# Patient Record
Sex: MALE | Race: WHITE | NOT HISPANIC OR LATINO | Employment: FULL TIME | URBAN - NONMETROPOLITAN AREA
[De-identification: names, ages, dates, MRNs, and addresses within clinical notes are randomized per-mention and may not be internally consistent; named-entity substitution may affect disease eponyms.]

---

## 2023-06-06 ENCOUNTER — TELEPHONE (OUTPATIENT)
Dept: PRIMARY CARE | Facility: CLINIC | Age: 43
End: 2023-06-06
Payer: COMMERCIAL

## 2023-06-06 DIAGNOSIS — M79.671 RIGHT FOOT PAIN: Primary | ICD-10-CM

## 2023-06-06 NOTE — TELEPHONE ENCOUNTER
Left patient detailed message about referral.  I did let him know that [facility] does not have any openings soon and to try [facility] down the road.  I asked him to call us and let us know where to send referral to.

## 2023-06-06 NOTE — TELEPHONE ENCOUNTER
"----- Message from Lovely Moyer MA sent at 6/5/2023  3:36 PM EDT -----  Regarding: FW: cortisone shot in right foot  Contact: [phone]    ----- Message -----  From: Pawel Mena \"Mike\"  Sent: 6/5/2023   3:01 PM EDT  To: [facility] [place] [address] Clinical Support Staff  Subject: cortisone shot in right foot                     i am having nerve pain in my right foot near the toes.  I had this some years ago and had cortisone shots in the bottom of foot to reduce inflammation in the central nerve in the foot and has come back since doing martial arts bare foot on the matts.    "

## 2023-06-26 ENCOUNTER — APPOINTMENT (OUTPATIENT)
Dept: PRIMARY CARE | Facility: CLINIC | Age: 43
End: 2023-06-26
Payer: COMMERCIAL

## 2023-07-27 ENCOUNTER — OFFICE VISIT (OUTPATIENT)
Dept: PRIMARY CARE | Facility: CLINIC | Age: 43
End: 2023-07-27
Payer: COMMERCIAL

## 2023-07-27 VITALS
TEMPERATURE: 97.9 F | WEIGHT: 239.4 LBS | RESPIRATION RATE: 14 BRPM | BODY MASS INDEX: 32.92 KG/M2 | SYSTOLIC BLOOD PRESSURE: 118 MMHG | HEART RATE: 78 BPM | OXYGEN SATURATION: 94 % | DIASTOLIC BLOOD PRESSURE: 74 MMHG

## 2023-07-27 DIAGNOSIS — J45.909 MILD ASTHMA WITHOUT COMPLICATION, UNSPECIFIED WHETHER PERSISTENT (HHS-HCC): ICD-10-CM

## 2023-07-27 DIAGNOSIS — F41.9 ANXIETY: ICD-10-CM

## 2023-07-27 DIAGNOSIS — I10 HYPERTENSION, UNSPECIFIED TYPE: ICD-10-CM

## 2023-07-27 DIAGNOSIS — G89.29 CHRONIC BILATERAL LOW BACK PAIN WITH RIGHT-SIDED SCIATICA: ICD-10-CM

## 2023-07-27 DIAGNOSIS — E78.5 HYPERLIPIDEMIA, UNSPECIFIED HYPERLIPIDEMIA TYPE: ICD-10-CM

## 2023-07-27 DIAGNOSIS — Z98.890 HISTORY OF KIDNEY SURGERY: ICD-10-CM

## 2023-07-27 DIAGNOSIS — Z00.00 HEALTHCARE MAINTENANCE: Primary | ICD-10-CM

## 2023-07-27 DIAGNOSIS — M54.2 NECK PAIN: ICD-10-CM

## 2023-07-27 DIAGNOSIS — E66.9 CLASS 1 OBESITY WITH BODY MASS INDEX (BMI) OF 32.0 TO 32.9 IN ADULT, UNSPECIFIED OBESITY TYPE, UNSPECIFIED WHETHER SERIOUS COMORBIDITY PRESENT: ICD-10-CM

## 2023-07-27 DIAGNOSIS — M54.41 CHRONIC BILATERAL LOW BACK PAIN WITH RIGHT-SIDED SCIATICA: ICD-10-CM

## 2023-07-27 DIAGNOSIS — G47.33 OSA (OBSTRUCTIVE SLEEP APNEA): ICD-10-CM

## 2023-07-27 PROBLEM — R45.4 IRRITABILITY: Status: ACTIVE | Noted: 2023-07-27

## 2023-07-27 PROBLEM — J30.9 ALLERGIC RHINITIS: Status: ACTIVE | Noted: 2023-07-27

## 2023-07-27 PROBLEM — G57.10 MERALGIA PARAESTHETICA: Status: ACTIVE | Noted: 2023-07-27

## 2023-07-27 PROBLEM — K22.2 SCHATZKI'S RING: Status: ACTIVE | Noted: 2023-07-27

## 2023-07-27 PROBLEM — R45.86 LABILE MOOD: Status: ACTIVE | Noted: 2023-07-27

## 2023-07-27 PROBLEM — R06.83 SNORING: Status: ACTIVE | Noted: 2023-07-27

## 2023-07-27 PROBLEM — R53.83 LOW ENERGY: Status: ACTIVE | Noted: 2023-07-27

## 2023-07-27 PROBLEM — R40.0 DAYTIME SOMNOLENCE: Status: ACTIVE | Noted: 2023-07-27

## 2023-07-27 PROBLEM — K21.9 ESOPHAGEAL REFLUX: Status: ACTIVE | Noted: 2023-07-27

## 2023-07-27 PROBLEM — R41.840 INATTENTION: Status: ACTIVE | Noted: 2023-07-27

## 2023-07-27 PROCEDURE — 99396 PREV VISIT EST AGE 40-64: CPT | Performed by: FAMILY MEDICINE

## 2023-07-27 PROCEDURE — 3074F SYST BP LT 130 MM HG: CPT | Performed by: FAMILY MEDICINE

## 2023-07-27 PROCEDURE — 3008F BODY MASS INDEX DOCD: CPT | Performed by: FAMILY MEDICINE

## 2023-07-27 PROCEDURE — 1036F TOBACCO NON-USER: CPT | Performed by: FAMILY MEDICINE

## 2023-07-27 PROCEDURE — 3078F DIAST BP <80 MM HG: CPT | Performed by: FAMILY MEDICINE

## 2023-07-27 RX ORDER — HYDROCHLOROTHIAZIDE 12.5 MG/1
CAPSULE ORAL
COMMUNITY
Start: 2021-10-26 | End: 2023-11-14 | Stop reason: SDUPTHER

## 2023-07-27 RX ORDER — LISINOPRIL 5 MG/1
TABLET ORAL
COMMUNITY
Start: 2022-01-25 | End: 2023-11-14 | Stop reason: SDUPTHER

## 2023-07-27 RX ORDER — FLUTICASONE PROPIONATE AND SALMETEROL 100; 50 UG/1; UG/1
POWDER RESPIRATORY (INHALATION)
COMMUNITY
Start: 2021-04-22 | End: 2023-09-19 | Stop reason: SDUPTHER

## 2023-07-27 RX ORDER — OMEPRAZOLE 40 MG/1
CAPSULE, DELAYED RELEASE ORAL
COMMUNITY
Start: 2021-04-22 | End: 2023-11-14 | Stop reason: SDUPTHER

## 2023-07-27 RX ORDER — ALBUTEROL SULFATE 90 UG/1
2 AEROSOL, METERED RESPIRATORY (INHALATION) EVERY 4 HOURS PRN
COMMUNITY
Start: 2016-08-04 | End: 2023-11-14 | Stop reason: SDUPTHER

## 2023-07-27 NOTE — ASSESSMENT & PLAN NOTE
Cholesterol panel shows genetic type pattern  1/2023 TC/HDL ratio 5.5 (, HDL 34)  Goal TC/HDL ratio 3.4 or less, LDL 99 or less,  or less, and TRIG 150 or less.     To lower this with lifestyle measures:  -make sure you are avoiding refined carbs such as breads, pasta, cereal, candy, soda,  nutrition bars, granola, chips, and sugar sweetened beverages.      -eat 5- 7 servings daily of veggies,  healthy protein such as chicken, fish,  beans, and eggs, and include healthy fats in your diet such as seeds, nuts, olive oil, avocados, and salmon.   -exercise 4 - 6 days per week as you are able, 150 minutes total weekly divided up is recommended.  -consider taking the fiber product psyllium capsules or powder 2 times daily (generic brand is fine) , or a brand name psyllium such as [facility] or Metamucil.  -consider also adding plant stanols and sterols such as Nature Made CholestOff, available over the counter.

## 2023-07-27 NOTE — ASSESSMENT & PLAN NOTE
Vaccines and screenings reviewed, these are UTD.  Questionnaires completed.  Health and wellness topics reviewed.  Diet and exercise recommendations revisited.  Routine blood work from 1/2023 were reviewed today, repeat labs to done prior to next OV.    Colon cancer screening not due until 44, will discuss next year.  Prostate cancer screening not due until 49.

## 2023-07-27 NOTE — ASSESSMENT & PLAN NOTE
Positionally exacerbated when sitting at desk for work.  Recommend changes positions throughout your work day.  May consider getting sit to stand desk if possible.     Stretching exercises recommended on routine basis.  Doing low back and core muscle strengthening exercises and continuing them lifelong can decrease your risk of re-injury, as well as help with acute symptoms.  Can look up PT exercise videos for neck and back pain online.  If does not improve can call for order for formal physical therapy.    See other recommendations under A/P for back pain.

## 2023-07-27 NOTE — ASSESSMENT & PLAN NOTE
States he has not gotten CPAP or started the process of getting one.  He would like to table this for now, opts to work on lifestyle efforts.  Patient can reach out for CPAP order if he wishes to pursue.

## 2023-07-27 NOTE — ASSESSMENT & PLAN NOTE
Positionally exacerbated when sitting at desk for work.  Recommend changes positions throughout your work day.  May consider getting sit to stand desk if possible.    Stretching exercises recommended on routine basis.  Doing low back and core muscle strengthening exercises and continuing them lifelong can decrease your risk of re-injury, as well as help with acute symptoms.  Can look up exercise videos online.  If does not improve can call for order for formal physical therapy.    Patient may use heat or cold, whichever provides greater relief.    Patient can use ibuprofen or Aleve as instructed on the bottle, unless unable to take (allergy, recent bleeding in stomach, decreased kidney function)  . Make sure you take those after food, risk of stomach upset, GI bleeding, etc. increased if taken on an empty stomach.    Know that there is an extremely small but real risk of heart or stroke with use of these medicines, particularly if a person has cardiac risk factors such as high blood pressure, cholesterol, diabetes.      Tylenol can safely be used at doses up to 500 mg 2 tabs 3 times daily, unless liver function is decreased.      Recommend tiger balm over-the-counter as a topical soothing agent.      Biofreeze or other menthol based products can also be tried.      Try Epsom salt soaks to see if this improves pain and muscle tightness.     Avoid stacked spinal motions with bending and twisting - turn your feet to face an object and bend your knees to lift, rather than twisting and bending at the waist to lift.      Red flag signs of numbness in groin/thighs, new incontinence of stool/ urine, fevers/chills/unexplained weight loss were reviewed-if at any point you experience any of these, proceed to the emergency room.

## 2023-07-27 NOTE — ASSESSMENT & PLAN NOTE
BP is 118/74 in office today, good control.  Continue HCTZ 12.5 mg (in AM) daily   Continue Lisinopril 5 mg (in PM) daily.

## 2023-07-27 NOTE — PROGRESS NOTES
Subjective   Patient ID: Mike Mena is a 43 y.o. male who presents for Annual Exam (Pt is here for annual physical exam- ABN given to pt and signed, pt verbalized understanding.) and Follow-up (Pt is here for follow up HTN, chol ).    HPI     CPE + ROUTINE OV    TDAP: 9/2022  SHINGRIX: N/A  PNEUMOVAX: N/A  EGD: 10/2021 (Schatzki's ring + dilation, normal stomach, normal duodenum)  CSCOPE: N/A  PSA: N/A  AAA SCREEN: N/A (never smoker)  HEP C SCREEN: none found  CACS: N/A    States he is doing well overall.  Just got back from vacation, rented RV for 20 days.  Since being back at work his back pain has returned.  Low back pain, more so on right and will radiate down RLE.  Also has discomfort where C-spine meets T-spine.  States does not matter how he sits it is bothersome.    Diet: Balanced, does Metamucil but only a couple of capsules as he had some bloating.    Exercise: walking in the morning in his neighborhoods, needs to get back into yoga, went hiking on recent vacation.    Alcohol use: 1 beverage per week on average, sometimes more if on vacation.    Smoking: never smoker    Prostate cancer screening: Denies family history.   Denies hesitancy, nocturia, or urgency.     Colon cancer screening: Denies family history.   Denies melena, hematochezia, constipation, diarrhea, bloating, change in bowel habits.    CHRONIC CONDITIONS:  -MOOD  Hx of anxiety, not presently on medications  Previously discussed medication vs lifestyle measures, patient disinclined to pursue medication at that time and would like to work on lifestyle measures. He reports he has already scheduled with a counselor at that time as well.     9/14/2022 Neuro-psych testing done with Dr. Bernadette Mazariegos  Recommendations:  1. Sleep study to r/o underlying sleep disorder  2. Outpatient psychotherapy for regulation of emotions and stress.  3. Practice linking behaviors  4. Have a steady routine  5. Alternating high and low interest tasks.  6. Taking  regular breaks.  7. Books, The Smart but Scattered Guide to Success by Tylor and [employer] for People with ADHD by [employer]  8. Repeat in 12-24 months to monitor for changes.    Mood stable.  Working with son to be accountable for anxiety.  Him and son have ADD so also working together on this.    -ASTHMA  Prescribed Wixela maintenance inhaler and albuterol rescue inhaler.  Stable.    -CONSTANTINO  2/2023 sleep study revealed mild CONSTANTINO with AHI3% 14.5 and AHI4% 8.7.  States he has not gotten CPAP or started the process of getting one.  He would like to table this for now, opts to work on lifestyle efforts.    -HTN  Taking HCTZ 12.5 mg (in AM) daily + Lisinopril 5 mg (in PM) daily.     Medications are being taken and tolerated well.   No side effects are reported.  Patient is taking blood pressure outside of office and reports good control.  Patient denies chest pain, shortness of breath with exertion, palpitations, lower extremity edema, headache, or dizziness.     -HLD  Lifestyle managed.  No CACS due to age.  FMH of father with vascular event in 60s as well as lymphedema.  1/2023 TC/HDL ratio 5.5 (, HDL 34)    Patient denies any facial droop, sudden vision loss, weakness or numbness on one side of body.   Patient denies chest pain, shortness of breath with exertion, palpitations, or symptoms of claudication.     -GERD  Taking Omeprazole 40 mg daily.  Last EGD in 10/2021 (Schatzki's ring + dilation, normal stomach, normal duodenum)    -HX OF KIDNEY SX  PUV surgery at age 16      Review of Systems   All other systems reviewed and are negative.      Objective   /74 (BP Location: Left arm, Patient Position: Sitting, BP Cuff Size: Large adult)   Pulse 78   Temp 36.6 °C (97.9 °F) (Temporal)   Resp 14   Wt 109 kg (239 lb 6.4 oz)   SpO2 94%   BMI 32.92 kg/m²     Physical Exam  Vitals and nursing note reviewed.   Constitutional:       General: He is not in acute distress.     Appearance: Normal  appearance. He is not toxic-appearing.   HENT:      Head: Normocephalic and atraumatic.   Eyes:      Extraocular Movements: Extraocular movements intact.      Pupils: Pupils are equal, round, and reactive to light.   Neck:      Thyroid: No thyromegaly.   Cardiovascular:      Rate and Rhythm: Normal rate and regular rhythm.      Heart sounds: No murmur heard.     No friction rub. No gallop.   Pulmonary:      Effort: Pulmonary effort is normal.      Breath sounds: Normal breath sounds. No wheezing, rhonchi or rales.   Abdominal:      General: Bowel sounds are normal. There is no distension.      Palpations: Abdomen is soft. There is no mass.      Tenderness: There is no abdominal tenderness. There is no guarding.   Musculoskeletal:      Right lower leg: No edema.      Left lower leg: No edema.   Lymphadenopathy:      Cervical: No cervical adenopathy.   Skin:     General: Skin is warm and dry.   Neurological:      General: No focal deficit present.      Mental Status: He is alert and oriented to person, place, and time.   Psychiatric:         Mood and Affect: Mood normal.         Behavior: Behavior normal.         Assessment/Plan   Problem List Items Addressed This Visit       Anxiety     Mood is doing well without medications.  Patient to keep up with his lifestyle efforts.  See recommendations for nutrition and exercise.         Asthma     Stable, continue with inhalers as prescribed.         CONSTANTINO (obstructive sleep apnea)     States he has not gotten CPAP or started the process of getting one.  He would like to table this for now, opts to work on lifestyle efforts.  Patient can reach out for CPAP order if he wishes to pursue.         Hyperlipidemia     Cholesterol panel shows genetic type pattern  1/2023 TC/HDL ratio 5.5 (, HDL 34)  Goal TC/HDL ratio 3.4 or less, LDL 99 or less,  or less, and TRIG 150 or less.     To lower this with lifestyle measures:  -make sure you are avoiding refined carbs such as  breads, pasta, cereal, candy, soda,  nutrition bars, granola, chips, and sugar sweetened beverages.      -eat 5- 7 servings daily of veggies,  healthy protein such as chicken, fish,  beans, and eggs, and include healthy fats in your diet such as seeds, nuts, olive oil, avocados, and salmon.   -exercise 4 - 6 days per week as you are able, 150 minutes total weekly divided up is recommended.  -consider taking the fiber product psyllium capsules or powder 2 times daily (generic brand is fine) , or a brand name psyllium such as [facility] or Metamucil.  -consider also adding plant stanols and sterols such as Nature Made CholestOff, available over the counter.          Hypertension     BP is 118/74 in office today, good control.  Continue HCTZ 12.5 mg (in AM) daily   Continue Lisinopril 5 mg (in PM) daily.         Neck pain     Positionally exacerbated when sitting at desk for work.  Recommend changes positions throughout your work day.  May consider getting sit to stand desk if possible.     Stretching exercises recommended on routine basis.  Doing low back and core muscle strengthening exercises and continuing them lifelong can decrease your risk of re-injury, as well as help with acute symptoms.  Can look up PT exercise videos for neck and back pain online.  If does not improve can call for order for formal physical therapy.    See other recommendations under A/P for back pain.         Healthcare maintenance - Primary     Vaccines and screenings reviewed, these are UTD.  Questionnaires completed.  Health and wellness topics reviewed.  Diet and exercise recommendations revisited.  Routine blood work from 1/2023 were reviewed today, repeat labs to done prior to next OV.    Colon cancer screening not due until 45, will discuss next year.  Prostate cancer screening not due until 50.         Relevant Orders    CBC    Comprehensive Metabolic Panel    Lipid Panel    Chronic bilateral low back pain with right-sided  sciatica     Positionally exacerbated when sitting at desk for work.  Recommend changes positions throughout your work day.  May consider getting sit to stand desk if possible.    Stretching exercises recommended on routine basis.  Doing low back and core muscle strengthening exercises and continuing them lifelong can decrease your risk of re-injury, as well as help with acute symptoms.  Can look up exercise videos online.  If does not improve can call for order for formal physical therapy.    Patient may use heat or cold, whichever provides greater relief.    Patient can use ibuprofen or Aleve as instructed on the bottle, unless unable to take (allergy, recent bleeding in stomach, decreased kidney function)  . Make sure you take those after food, risk of stomach upset, GI bleeding, etc. increased if taken on an empty stomach.    Know that there is an extremely small but real risk of heart or stroke with use of these medicines, particularly if a person has cardiac risk factors such as high blood pressure, cholesterol, diabetes.      Tylenol can safely be used at doses up to 500 mg 2 tabs 3 times daily, unless liver function is decreased.      Recommend tiger balm over-the-counter as a topical soothing agent.      Biofreeze or other menthol based products can also be tried.      Try Epsom salt soaks to see if this improves pain and muscle tightness.     Avoid stacked spinal motions with bending and twisting - turn your feet to face an object and bend your knees to lift, rather than twisting and bending at the waist to lift.      Red flag signs of numbness in groin/thighs, new incontinence of stool/ urine, fevers/chills/unexplained weight loss were reviewed-if at any point you experience any of these, proceed to the emergency room.          History of kidney surgery     PUV surgery at age 16  1/2023 CMP showed kidney function normal.  Will continue to monitor CMP on routine basis.          Other Visit Diagnoses        Class 1 obesity with body mass index (BMI) of 32.0 to 32.9 in adult, unspecified obesity type, unspecified whether serious comorbidity present                Follow-up in 6 months for routine care.  Labs to be done prior.  Call for sooner follow-up if needed.     Time Spent  Prep time on day of patient encounter: 5 minutes  Time spent directly with patient, family or caregiver: 28 minutes  Additional Time Spent on Patient Care Activities: 0 minutes  Documentation Time: 7 minutes  Other Time Spent: 0 minutes  Total: 40 minutes      Scribe Attestation  By signing my name below, IJulissa Scribe   attest that this documentation has been prepared under the direction and in the presence of Lillian Miner DO.

## 2023-07-27 NOTE — ASSESSMENT & PLAN NOTE
PUV surgery at age 16  1/2023 CMP showed kidney function normal.  Will continue to monitor CMP on routine basis.

## 2023-07-27 NOTE — ASSESSMENT & PLAN NOTE
Mood is doing well without medications.  Patient to keep up with his lifestyle efforts.  See recommendations for nutrition and exercise.

## 2023-09-19 ENCOUNTER — OFFICE VISIT (OUTPATIENT)
Dept: PRIMARY CARE | Facility: CLINIC | Age: 43
End: 2023-09-19
Payer: COMMERCIAL

## 2023-09-19 VITALS
DIASTOLIC BLOOD PRESSURE: 74 MMHG | WEIGHT: 242.3 LBS | HEIGHT: 70 IN | OXYGEN SATURATION: 95 % | TEMPERATURE: 97.5 F | BODY MASS INDEX: 34.69 KG/M2 | SYSTOLIC BLOOD PRESSURE: 111 MMHG | HEART RATE: 82 BPM | RESPIRATION RATE: 16 BRPM

## 2023-09-19 DIAGNOSIS — R10.31 RIGHT GROIN PAIN: Primary | ICD-10-CM

## 2023-09-19 DIAGNOSIS — M79.604 RIGHT LEG PAIN: ICD-10-CM

## 2023-09-19 DIAGNOSIS — J45.909 MILD ASTHMA WITHOUT COMPLICATION, UNSPECIFIED WHETHER PERSISTENT (HHS-HCC): ICD-10-CM

## 2023-09-19 PROCEDURE — 3074F SYST BP LT 130 MM HG: CPT | Performed by: FAMILY MEDICINE

## 2023-09-19 PROCEDURE — 99213 OFFICE O/P EST LOW 20 MIN: CPT | Performed by: FAMILY MEDICINE

## 2023-09-19 PROCEDURE — 3008F BODY MASS INDEX DOCD: CPT | Performed by: FAMILY MEDICINE

## 2023-09-19 PROCEDURE — 3078F DIAST BP <80 MM HG: CPT | Performed by: FAMILY MEDICINE

## 2023-09-19 PROCEDURE — 1036F TOBACCO NON-USER: CPT | Performed by: FAMILY MEDICINE

## 2023-09-19 RX ORDER — FLUTICASONE PROPIONATE AND SALMETEROL 100; 50 UG/1; UG/1
POWDER RESPIRATORY (INHALATION)
Qty: 60 EACH | Refills: 3 | Status: SHIPPED | OUTPATIENT
Start: 2023-09-19 | End: 2023-09-22

## 2023-09-19 NOTE — PROGRESS NOTES
"Subjective   Patient ID: Mike Mena is a 43 y.o. male who presents for Hernia (Pt presents for possible hernia in his groin area- pt states that he isnt sure if this is a pulled muscle or a hernia- pt states that he noticed this in June and this comes and goes ).    HPI     Patient last seen 7/27/2023 with routine OV + CPE, to RTC in 6 months.    Patient here for evaluation of discomfort in right groin area.  States noticed this in June, comes and goes.  Around that time he was moving furniture into his kids room  Did not have injury or incident during the moving that he can recall.  No bruising, swelling or skin discoloration that he can recall.  Wonders if possible pulled muscle or hernia    States comes and goes, sometimes irritating but not today.  Feels a discomfort when he walks.  Can be exacerbated when running with kids or lifting heavy things.    Had umbilical hernia when was younger that was repaired.  Has not seen any bulging in area.  Does not have any swelling that he can see.    Denies any urinary symptoms, no change in frequency, no hematuria, no dysuria.    He has tried stretching, helps a little.  He has taking NSAIDs, transient relief but then returns.    Patient requesting refill of Wixela inhaler.    Review of Systems   All other systems reviewed and are negative.      Objective   /74 (BP Location: Left arm, Patient Position: Sitting, BP Cuff Size: Large adult)   Pulse 82   Temp 36.4 °C (97.5 °F) (Temporal)   Resp 16   Ht 1.778 m (5' 10\")   Wt 110 kg (242 lb 4.8 oz)   SpO2 95%   BMI 34.77 kg/m²     Physical Exam  Vitals and nursing note reviewed.   Constitutional:       General: He is not in acute distress.     Appearance: Normal appearance. He is not toxic-appearing.   HENT:      Head: Normocephalic and atraumatic.   Cardiovascular:      Rate and Rhythm: Normal rate and regular rhythm.      Heart sounds: No murmur heard.     No friction rub. No gallop.   Pulmonary:      Effort: " Pulmonary effort is normal.      Breath sounds: Normal breath sounds. No wheezing, rhonchi or rales.   Musculoskeletal:      Comments: No palpable groin hernia or bulge.  No discomfort with internal and external rotation of right hip.  Non-tender at AIIS or ASIS  Tender to palpation at origin of medial thigh adductors.  Strength 5/5 with adduction, abduction, internal, and external rotation.  No ecchymosis or swelling noted.   Neurological:      General: No focal deficit present.      Mental Status: He is alert and oriented to person, place, and time.   Psychiatric:         Mood and Affect: Mood normal.         Behavior: Behavior normal.         Assessment/Plan   Diagnoses and all orders for this visit:  Right groin pain  -     Referral to Physical Therapy; Future  Right leg pain  -     Referral to Physical Therapy; Future  Mild asthma without complication, unspecified whether persistent  -     fluticasone propion-salmeteroL (Wixela Inhub) 100-50 mcg/dose diskus inhaler; 1 puff twice daily    Right groin pain x 3 months, intermittent.  Patient reassured, no signs of hernia on exam.  Possible tendonitis/strain.    Physical therapy order placed today.   Doing strengthening exercises and continuing them lifelong can decrease your risk of re-injury, as well as help with acute symptoms.      Patient can use ibuprofen or Aleve as instructed on the bottle, unless unable to take (allergy, recent bleeding in stomach, decreased kidney function). Make sure you take those after food, risk of stomach upset, GI bleeding, etc. increased if taken on an empty stomach.    Tylenol can safely be used at doses up to 500 mg 2 tabs 3 times daily, unless liver function is decreased.      Recommend tiger balm over-the-counter as a topical soothing agent.      Biofreeze or other menthol based products can also be tried.        Follow-up as scheduled for routine care.  Call for sooner follow-up if needed.     Scribe Attestation  By signing my  name below, I, Lesa Dumont   attest that this documentation has been prepared under the direction and in the presence of Lillian Miner DO.

## 2023-09-21 DIAGNOSIS — J45.909 MILD ASTHMA WITHOUT COMPLICATION, UNSPECIFIED WHETHER PERSISTENT (HHS-HCC): ICD-10-CM

## 2023-09-22 RX ORDER — FLUTICASONE PROPIONATE AND SALMETEROL 100; 50 UG/1; UG/1
POWDER RESPIRATORY (INHALATION)
Qty: 180 EACH | Refills: 3 | Status: SHIPPED | OUTPATIENT
Start: 2023-09-22 | End: 2023-09-29 | Stop reason: SDUPTHER

## 2023-09-29 ENCOUNTER — TELEPHONE (OUTPATIENT)
Dept: PRIMARY CARE | Facility: CLINIC | Age: 43
End: 2023-09-29
Payer: COMMERCIAL

## 2023-09-29 DIAGNOSIS — J45.909 MILD ASTHMA WITHOUT COMPLICATION, UNSPECIFIED WHETHER PERSISTENT (HHS-HCC): ICD-10-CM

## 2023-09-29 RX ORDER — FLUTICASONE PROPIONATE AND SALMETEROL 100; 50 UG/1; UG/1
1 POWDER RESPIRATORY (INHALATION)
Qty: 180 EACH | Refills: 3 | Status: SHIPPED | OUTPATIENT
Start: 2023-09-29 | End: 2023-11-13 | Stop reason: SDUPTHER

## 2023-09-29 RX ORDER — FLUTICASONE PROPIONATE AND SALMETEROL 100; 50 UG/1; UG/1
POWDER RESPIRATORY (INHALATION)
Qty: 180 EACH | Refills: 3 | Status: SHIPPED | OUTPATIENT
Start: 2023-09-29 | End: 2023-09-29 | Stop reason: SDUPTHER

## 2023-10-12 ENCOUNTER — EVALUATION (OUTPATIENT)
Dept: PHYSICAL THERAPY | Facility: CLINIC | Age: 43
End: 2023-10-12
Payer: COMMERCIAL

## 2023-10-12 DIAGNOSIS — R10.31 RIGHT GROIN PAIN: Primary | ICD-10-CM

## 2023-10-12 DIAGNOSIS — M79.604 RIGHT LEG PAIN: ICD-10-CM

## 2023-10-12 PROCEDURE — 97110 THERAPEUTIC EXERCISES: CPT | Mod: GP | Performed by: PHYSICAL THERAPIST

## 2023-10-12 PROCEDURE — 97161 PT EVAL LOW COMPLEX 20 MIN: CPT | Mod: GP | Performed by: PHYSICAL THERAPIST

## 2023-10-12 ASSESSMENT — PAIN DESCRIPTION - DESCRIPTORS: DESCRIPTORS: ACHING;TIGHTNESS

## 2023-10-12 ASSESSMENT — ENCOUNTER SYMPTOMS
LOSS OF SENSATION IN FEET: 0
DEPRESSION: 0
OCCASIONAL FEELINGS OF UNSTEADINESS: 0

## 2023-10-12 ASSESSMENT — PAIN SCALES - GENERAL: PAINLEVEL_OUTOF10: 5 - MODERATE PAIN

## 2023-10-12 ASSESSMENT — PATIENT HEALTH QUESTIONNAIRE - PHQ9
1. LITTLE INTEREST OR PLEASURE IN DOING THINGS: NOT AT ALL
SUM OF ALL RESPONSES TO PHQ9 QUESTIONS 1 AND 2: 0
2. FEELING DOWN, DEPRESSED OR HOPELESS: NOT AT ALL

## 2023-10-12 ASSESSMENT — PAIN - FUNCTIONAL ASSESSMENT: PAIN_FUNCTIONAL_ASSESSMENT: 0-10

## 2023-10-12 NOTE — PROGRESS NOTES
Physical Therapy    Physical Therapy Lower Extremity Evaluation    Patient Name: Pawel Mena  MRN: [id number]  Today's Date: 10/12/2023  Time Calculation  Start Time: 0700  Stop Time: 0745  Time Calculation (min): 45 min    Current Problem  Problem List Items Addressed This Visit             ICD-10-CM    Right groin pain - Primary R10.31    Relevant Orders    Follow Up In Physical Therapy    Right leg pain M79.604    Relevant Orders    Follow Up In Physical Therapy       Precautions  Precautions  Precautions Comment: none     Pain  Pain Assessment: 0-10  Pain Score: 5 - Moderate pain  Pain Type: Chronic pain  Pain Descriptors: Aching, Tightness    SUBJECTIVE:   Chief complaint:  Pt reports he had an onset of R groin pain after lifting some furniture upstairs. Notes pain is intermittent. Last felt about 2 weeks. Notes some tightness in the R hip and low back. Denies any significant weakness in the R LE.  Denies any further pain radiation into R LE.      Pain Better: rest, OTC meds    Pain Worse: running, kicking.     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     OBJECTIVE:    Lower Extremity Strength:  MMT 5/5 max  RIGHT LEFT   Hip Flexion 4+/5 4/+5   Hip Extension 5/5 5/5   Hip Abduction 4+/5 4+/5   Hip Adduction 4-/5 4/5   Knee Extension 5/5 5/5   Knee Flexion 5/5 5/5   Ankle DF 5/5 5/5   Ankle PF 5/5 5/5   Ankle INV 5/5 5/5   Ankle EV 5/5 5/5     Lower Extremity ROM: WNL unless documented below:  ROM in Degrees  RIGHT LEFT   Hip Flexion WNL WNL   Hip Extension 5* WNL   Hip Abduction 25* WNL   Hip Adduction WNL WNL   Knee Extension WNL WNL   Knee Flexion WNL WNL     Posture: Slight slouch while seated.  Palpation No tenderness in the R hip and groin  region as well as the hip adductor muscles.   Neurological symptoms: none.  Special tests:    HIP RIGHT LEFT   Scour neg    Fadir neg    Curly positive      Other Measures  Lower Extremity Funtional Score (LEFS): 73/80     TREATMENT:  Eval  2.  HEP: Access Code: [email or web address]  URL: [email or web address]  Date: 10/12/2023  Prepared by:  Ryan    Exercises  - Modified Kun Stretch  - 1 x daily - 7 x weekly - 1 sets - 3 reps - 30 sec hold  - Half Kneeling Hip Flexor Stretch  - 1 x daily - 7 x weekly - 1 sets - 3 reps - 30 sec hold  - Supine Butterfly Groin Stretch  - 1 x daily - 7 x weekly - 1 sets - 3 reps - 30 sec hold  - Prone Quadriceps Stretch with Strap  - 1 x daily - 7 x weekly - 1 sets - 3 reps - 30 sec hold  - Standing Hamstring Stretch with Step (Mirrored)  - 1 x daily - 7 x weekly - 1 sets - 3 reps - 30 sec hold  - Supine Hip Adduction Isometric with Ball  - 1 x daily - 7 x weekly - 2 sets - 10 reps - 5 sec hold    ASSESSEMENT  Pt is a 43 y.o. referred to physical therapy for a dx of R groin and leg pain by Lillian Miner, *. Pt presents with minimal R groin pain. Loss of hip AROM, loss of hip strength and overall reduced function. This pt would benefit from a therapy program to restore prior level of function, reduce pain, increase AROM, and increase strength.    The physical therapy prognosis is good for the patient to achieve their goals.   The pt tolerated therapy treatment today well with no adverse effects.  Barriers to therapy include:  none    PLAN  The pt will be seen 1 days a week for 4 weeks.      The pt has been educated about the risks and benefits of physical therapy including manual therapy treatments and gives consent for treatment.     The patient will benefit from physical therapy treatment to include: therapeutic exercises, therapeutic activities, neurological re-education, manual therapy, modalities, and a home exercise program.     Goals:  Active       PT  Problem       Reduce pain at worst to 0-1/10 with all prior activity.       Start:  10/12/23    Expected End:  11/11/23            Increase hip AROM to WNL all ranges for gait, stairs, self care and ADL's.       Start:  10/12/23    Expected End:  11/11/23            Pt to increase LE strength to grossly 5/5 for improved gait, stairs, lifting and ADL's.       Start:  10/12/23    Expected End:  11/11/23            Pt to score 80/80 points on LEFS to display overall increased function.         Start:  10/12/23    Expected End:  11/11/23            Pt to be independent with a HEP for self management.       Start:  10/12/23    Expected End:  11/11/23

## 2023-10-12 NOTE — LETTER
October 12, 2023     Patient: Pawel Mena   YOB: 1980   Date of Visit: 10/12/2023       To Whom It May Concern:    It is my medical opinion that Pawel Mena {Work release (duty restriction):55116}.    If you have any questions or concerns, please don't hesitate to call.         Sincerely,        Marty Pelaez, PT    CC: No Recipients

## 2023-10-12 NOTE — LETTER
October 12, 2023     Patient: Pawel Mena   YOB: 1980   Date of Visit: 10/12/2023       To Whom it May Concern:    Pawel Mena was seen in my clinic on 10/12/2023. He {Return to school/sport:55798}.    If you have any questions or concerns, please don't hesitate to call.         Sincerely,          Marty Pelaez, PT        CC: No Recipients

## 2023-10-19 ENCOUNTER — TREATMENT (OUTPATIENT)
Dept: PHYSICAL THERAPY | Facility: CLINIC | Age: 43
End: 2023-10-19
Payer: COMMERCIAL

## 2023-10-19 DIAGNOSIS — M79.604 RIGHT LEG PAIN: ICD-10-CM

## 2023-10-19 DIAGNOSIS — R10.31 RIGHT GROIN PAIN: ICD-10-CM

## 2023-10-19 PROCEDURE — 97110 THERAPEUTIC EXERCISES: CPT | Mod: CQ,GP

## 2023-10-19 ASSESSMENT — PAIN DESCRIPTION - DESCRIPTORS: DESCRIPTORS: ACHING;TIGHTNESS

## 2023-10-19 ASSESSMENT — PAIN - FUNCTIONAL ASSESSMENT: PAIN_FUNCTIONAL_ASSESSMENT: 0-10

## 2023-10-19 ASSESSMENT — PAIN SCALES - GENERAL: PAINLEVEL_OUTOF10: 5 - MODERATE PAIN

## 2023-10-19 NOTE — PROGRESS NOTES
Physical Therapy Treatment    Patient Name: Pawel Mena  MRN: [id number]  Today's Date: 10/19/2023  Time Calculation  Start Time: 1450  Stop Time: 1530  Time Calculation (min): 40 min    Current Problem:  Problem List Items Addressed This Visit             ICD-10-CM    Right groin pain R10.31    Right leg pain M79.604       Subjective   General:   Pt reports R groin has been feeling better. Last time he felt pain was when he was playing soccer with his son.   HEP is going well.     Pain:  Pain Assessment: 0-10  Pain Score: 5 - Moderate pain  Pain Type: Chronic pain  Pain Descriptors: Aching, Tightness    Precautions:  Precautions  Precautions Comment: None      Objective   No objective measures taken this visit    Treatment:  Therapeutic exercise  Upright bike x 5 min L3  R hip flex stretch, supine x 1 min   AE R hip flex  stretch x 1 min   R step up fwd 8'' 2x10   R pipo test hip flex stretch 2 x 30 sec   R hip flex raise 2x10 (in pipo test position)  R SLR 2x10   R hip add squeeze 3 sec hold 2x10   Monster walk RTB 20 ft x 2   Inchworm RTB 20 ft x 2    Leg press 100# 2x10       Assessment   Pt tolerated initiation of tx well without complaints of pain, just fatigue. Required cues to distribute equal weight into LE;s with exercises. No increased pain post tx.     Plan    Continue to progress POC as tolerated by patient to improve strength, mobility and overall function    Goals:  Active       PT Problem       Reduce pain at worst to 0-1/10 with all prior activity.       Start:  10/12/23    Expected End:  11/11/23            Increase hip AROM to WNL all ranges for gait, stairs, self care and ADL's.       Start:  10/12/23    Expected End:  11/11/23            Pt to increase LE strength to grossly 5/5 for improved gait, stairs, lifting and ADL's.       Start:  10/12/23    Expected End:  11/11/23            Pt to score 80/80 points on LEFS to display overall increased function.         Start:  10/12/23     Expected End:  11/11/23            Pt to be independent with a HEP for self management.       Start:  10/12/23    Expected End:  11/11/23                 Access Code: [email or web address]  URL: [email or web address]  Date: 10/19/2023  Prepared by: Anna Almazan    Exercises  - Modified Kun Stretch  - 1 x daily - 7 x weekly - 1 sets - 3 reps - 30 sec hold  - Half Kneeling Hip Flexor Stretch  - 1 x daily - 7 x weekly - 1 sets - 3 reps - 30 sec hold  - Supine Butterfly Groin Stretch  - 1 x daily - 7 x weekly - 1 sets - 3 reps - 30 sec hold  - Prone Quadriceps Stretch with Strap  - 1 x daily - 7 x weekly - 1 sets - 3 reps - 30 sec hold  - Standing Hamstring Stretch with Step (Mirrored)  - 1 x daily - 7 x weekly - 1 sets - 3 reps - 30 sec hold  - Supine Hip Adduction Isometric with Ball  - 1 x daily - 7 x weekly - 2 sets - 10 reps - 5 sec hold  - Straight Leg Raise  - 1 x daily - 7 x weekly - 2-3 sets - 10 reps

## 2023-10-26 ENCOUNTER — TREATMENT (OUTPATIENT)
Dept: PHYSICAL THERAPY | Facility: CLINIC | Age: 43
End: 2023-10-26
Payer: COMMERCIAL

## 2023-10-26 DIAGNOSIS — M79.604 RIGHT LEG PAIN: ICD-10-CM

## 2023-10-26 DIAGNOSIS — R10.31 RIGHT GROIN PAIN: Primary | ICD-10-CM

## 2023-10-26 PROCEDURE — 97110 THERAPEUTIC EXERCISES: CPT | Mod: GP | Performed by: PHYSICAL THERAPIST

## 2023-10-26 ASSESSMENT — PAIN SCALES - GENERAL: PAINLEVEL_OUTOF10: 1

## 2023-10-26 ASSESSMENT — PAIN - FUNCTIONAL ASSESSMENT: PAIN_FUNCTIONAL_ASSESSMENT: 0-10

## 2023-10-26 NOTE — PROGRESS NOTES
"Physical Therapy    Physical Therapy Treatment    Patient Name: Pawel Mena  MRN: [id number]  Today's Date: 10/26/2023  Time Calculation  Start Time: 0746  Stop Time: 0830  Time Calculation (min): 44 min    Current Problem  Problem List Items Addressed This Visit             ICD-10-CM    Right groin pain - Primary R10.31    Right leg pain M79.604        Subjective   Pt notes he feels much better today with less overall groin pain.     Precautions  Precautions  Precautions Comment: None    Pain  Pain Assessment: 0-10  Pain Score: 1      Objective   No measures today     Treatments:  Therapeutic exercise x 44 min  Upright bike x 5 min L3  R hip flex stretch, supine x 1 min   AE R hip flex  stretch x 1 min   R step up fwd 8'' 2x10    R lateral step 6\" 2 x 10   R pipo test hip flex stretch 2 x 30 sec   R hip flex raise 2x10 (in pipo test position)  R 4 way SLR 2x10   R hip add squeeze 3 sec hold 2x10   Monster walk RTB 20 ft x 2   Inchworm RTB 20 ft x 2    Leg press 100# 2x10     Assessment:  Pt overall progressing well with his R groin with a good reduction thus far in his pain. Progressed with WB activity today in the clinic     Plan:  Continue to progress strength and flexibility as tolerated R LE and groin.    Goals:  Active       PT Problem       Reduce pain at worst to 0-1/10 with all prior activity.       Start:  10/12/23    Expected End:  11/11/23            Increase hip AROM to WNL all ranges for gait, stairs, self care and ADL's.       Start:  10/12/23    Expected End:  11/11/23            Pt to increase LE strength to grossly 5/5 for improved gait, stairs, lifting and ADL's.       Start:  10/12/23    Expected End:  11/11/23            Pt to score 80/80 points on LEFS to display overall increased function.         Start:  10/12/23    Expected End:  11/11/23            Pt to be independent with a HEP for self management.       Start:  10/12/23    Expected End:  11/11/23               "

## 2023-11-02 ENCOUNTER — TREATMENT (OUTPATIENT)
Dept: PHYSICAL THERAPY | Facility: CLINIC | Age: 43
End: 2023-11-02
Payer: COMMERCIAL

## 2023-11-02 DIAGNOSIS — R10.31 RIGHT GROIN PAIN: Primary | ICD-10-CM

## 2023-11-02 DIAGNOSIS — M79.604 RIGHT LEG PAIN: ICD-10-CM

## 2023-11-02 PROCEDURE — 97110 THERAPEUTIC EXERCISES: CPT | Mod: GP | Performed by: PHYSICAL THERAPIST

## 2023-11-02 ASSESSMENT — PAIN DESCRIPTION - DESCRIPTORS: DESCRIPTORS: TIGHTNESS

## 2023-11-02 ASSESSMENT — PAIN - FUNCTIONAL ASSESSMENT: PAIN_FUNCTIONAL_ASSESSMENT: 0-10

## 2023-11-02 ASSESSMENT — PAIN SCALES - GENERAL: PAINLEVEL_OUTOF10: 1

## 2023-11-02 NOTE — PROGRESS NOTES
"Physical Therapy    Physical Therapy Treatment    Patient Name: Pawel Mena  MRN: [id number]  Today's Date: 11/2/2023  Time Calculation  Start Time: 0745  Stop Time: 0829  Time Calculation (min): 44 min    Current Problem  Problem List Items Addressed This Visit             ICD-10-CM    Right groin pain - Primary R10.31    Right leg pain M79.604        Subjective   Pt overall notes his hip is doing well with only stiffness noted.     Precautions  Precautions  Precautions Comment: None    Pain  Pain Assessment: 0-10  Pain Score: 1  Pain Descriptors: Tightness    Objective   No measures today    Treatments:  Therapeutic exercise x 44 min  Upright bike x 5 min L3  R hip flex stretch, supine x 1 min   AE R hip flex  stretch x 1 min   R step up fwd 8'' 2x10   R lateral step 8\" 2 x 10   R pipo test hip flex stretch 2 x 30 sec   R hip flex raise 2x10 (in pipo test position)  B hip 3 way t-band 2 x 10 ea  R hip add squeeze 3 sec hold 2x10   Monster walk RTB 20 ft x 4   Inchworm RTB 20 ft x 4    Leg press 100# 2x10     Assessment:  Pt overall progressing well towards his LTG's and doing well with min to no pain in the groin right side. Pt overall tolerated treatment well with standing hip T-band and increase in step height.     Plan:  Continue 1 visit next week with re-check     Goals:  Active       PT Problem       Reduce pain at worst to 0-1/10 with all prior activity.       Start:  10/12/23    Expected End:  11/11/23            Increase hip AROM to WNL all ranges for gait, stairs, self care and ADL's.       Start:  10/12/23    Expected End:  11/11/23            Pt to increase LE strength to grossly 5/5 for improved gait, stairs, lifting and ADL's.       Start:  10/12/23    Expected End:  11/11/23            Pt to score 80/80 points on LEFS to display overall increased function.         Start:  10/12/23    Expected End:  11/11/23            Pt to be independent with a HEP for self management.       Start:  10/12/23 "    Expected End:  11/11/23

## 2023-11-09 ENCOUNTER — TREATMENT (OUTPATIENT)
Dept: PHYSICAL THERAPY | Facility: CLINIC | Age: 43
End: 2023-11-09
Payer: COMMERCIAL

## 2023-11-09 DIAGNOSIS — R10.31 RIGHT GROIN PAIN: Primary | ICD-10-CM

## 2023-11-09 DIAGNOSIS — M79.604 RIGHT LEG PAIN: ICD-10-CM

## 2023-11-09 PROCEDURE — 97110 THERAPEUTIC EXERCISES: CPT | Mod: GP | Performed by: PHYSICAL THERAPIST

## 2023-11-09 ASSESSMENT — PAIN SCALES - GENERAL: PAINLEVEL_OUTOF10: 0 - NO PAIN

## 2023-11-09 ASSESSMENT — PAIN - FUNCTIONAL ASSESSMENT: PAIN_FUNCTIONAL_ASSESSMENT: 0-10

## 2023-11-09 NOTE — PROGRESS NOTES
"Physical Therapy    Physical Therapy Treatment    Patient Name: Pawel Mena  MRN: [id number]  Today's Date: 11/9/2023  Time Calculation  Start Time: 0745  Stop Time: 0825  Time Calculation (min): 40 min    Current Problem  Problem List Items Addressed This Visit             ICD-10-CM    Right groin pain - Primary R10.31    Right leg pain M79.604        Subjective   Pt overall reports he continues to report only a feeling in tightness.     Precautions  Precautions  Precautions Comment: None    Pain  Pain Assessment: 0-10  Pain Score: 0 - No pain      Objective   No measures today     Treatments:  Therapeutic exercise x 40 min  Upright bike x 5 min L3  R hip flex stretch, supine x 1 min   AE R hip flex  stretch x 1 min   R step up fwd 8'' 2x10   R lateral step 8\" 2 x 10   R pipo test hip flex stretch 2 x 30 sec   R hip flex raise 2x10 (in pipo test position)  B hip 3 way t-band 2 x 10 ea RTB   R hip add squeeze 3 sec hold 2x10   Monster walk RTB 20 ft x 4   Inchworm RTB 20 ft x 4    Leg press 100# 2x10     Assessment:  Pt overall progressing very well with his hip and groin with no issues. Progressing well with ROM and strength as well as stability and balance. Should do well over the next 2 weeks on his on then will re-check and discharge to [facility].     Plan:  Pt will be seen one more visit in 2 weeks for re-check and discharge since he is doing well with his hip/groin pain.     Goals:  Active       PT Problem       Reduce pain at worst to 0-1/10 with all prior activity.       Start:  10/12/23    Expected End:  11/11/23            Increase hip AROM to WNL all ranges for gait, stairs, self care and ADL's.       Start:  10/12/23    Expected End:  11/11/23            Pt to increase LE strength to grossly 5/5 for improved gait, stairs, lifting and ADL's.       Start:  10/12/23    Expected End:  11/11/23            Pt to score 80/80 points on LEFS to display overall increased function.         Start:  10/12/23    " Expected End:  11/11/23            Pt to be independent with a HEP for self management.       Start:  10/12/23    Expected End:  11/11/23

## 2023-11-13 DIAGNOSIS — J45.909 MILD ASTHMA WITHOUT COMPLICATION, UNSPECIFIED WHETHER PERSISTENT (HHS-HCC): ICD-10-CM

## 2023-11-14 DIAGNOSIS — I10 HYPERTENSION, UNSPECIFIED TYPE: ICD-10-CM

## 2023-11-14 DIAGNOSIS — K21.00 GASTROESOPHAGEAL REFLUX DISEASE WITH ESOPHAGITIS WITHOUT HEMORRHAGE: ICD-10-CM

## 2023-11-14 DIAGNOSIS — J45.20 MILD INTERMITTENT ASTHMA WITHOUT COMPLICATION (HHS-HCC): ICD-10-CM

## 2023-11-14 RX ORDER — HYDROCHLOROTHIAZIDE 12.5 MG/1
12.5 CAPSULE ORAL EVERY MORNING
Qty: 90 CAPSULE | Refills: 3 | Status: SHIPPED | OUTPATIENT
Start: 2023-11-14 | End: 2024-02-20 | Stop reason: SDUPTHER

## 2023-11-14 RX ORDER — LISINOPRIL 5 MG/1
5 TABLET ORAL DAILY
Qty: 90 TABLET | Refills: 3 | Status: SHIPPED | OUTPATIENT
Start: 2023-11-14 | End: 2024-02-20 | Stop reason: SDUPTHER

## 2023-11-14 RX ORDER — ALBUTEROL SULFATE 90 UG/1
2 AEROSOL, METERED RESPIRATORY (INHALATION) EVERY 4 HOURS PRN
Qty: 18 G | Refills: 2 | Status: SHIPPED | OUTPATIENT
Start: 2023-11-14

## 2023-11-14 RX ORDER — OMEPRAZOLE 40 MG/1
40 CAPSULE, DELAYED RELEASE ORAL
Qty: 90 CAPSULE | Refills: 3 | Status: SHIPPED | OUTPATIENT
Start: 2023-11-14 | End: 2024-02-20 | Stop reason: SDUPTHER

## 2023-11-14 RX ORDER — FLUTICASONE PROPIONATE AND SALMETEROL 100; 50 UG/1; UG/1
1 POWDER RESPIRATORY (INHALATION)
Qty: 180 EACH | Refills: 3 | Status: SHIPPED | OUTPATIENT
Start: 2023-11-14

## 2023-12-07 ENCOUNTER — TREATMENT (OUTPATIENT)
Dept: PHYSICAL THERAPY | Facility: CLINIC | Age: 43
End: 2023-12-07
Payer: COMMERCIAL

## 2023-12-07 DIAGNOSIS — R10.31 RIGHT GROIN PAIN: Primary | ICD-10-CM

## 2023-12-07 DIAGNOSIS — M79.604 RIGHT LEG PAIN: ICD-10-CM

## 2023-12-07 PROCEDURE — 97110 THERAPEUTIC EXERCISES: CPT | Mod: GP | Performed by: PHYSICAL THERAPIST

## 2023-12-07 ASSESSMENT — PAIN SCALES - GENERAL: PAINLEVEL_OUTOF10: 0 - NO PAIN

## 2023-12-07 ASSESSMENT — PAIN - FUNCTIONAL ASSESSMENT: PAIN_FUNCTIONAL_ASSESSMENT: 0-10

## 2023-12-07 NOTE — PROGRESS NOTES
"Physical Therapy    Physical Therapy Treatment/Discharge    Patient Name: Pawel Mena  MRN: [id number]  Today's Date: 12/7/2023  Time Calculation  Start Time: 0745  Stop Time: 0830  Time Calculation (min): 45 min    Current Problem  Problem List Items Addressed This Visit             ICD-10-CM    Right groin pain - Primary R10.31    Right leg pain M79.604        Subjective   Pt reports he is doing well with his R hip with no pain in the groin or hip region. Notes improved strength and been doing well with activity.     Precautions  Precautions  Precautions Comment: None    Pain  Pain Assessment: 0-10  Pain Score: 0 - No pain      Objective   Lower Extremity Strength:  MMT 5/5 max  RIGHT LEFT   Hip Flexion 5/5 5/5   Hip Extension 5/5 5/5   Hip Abduction 5/5 5/5   Hip Adduction 5/5 5/5   Knee Extension 5/5 5/5   Knee Flexion 5/5 5/5   Ankle DF 5/5 5/5   Ankle PF 5/5 5/5   Ankle INV 5/5 5/5   Ankle EV 5/5 5/5     Lower Extremity ROM: WNL unless documented below:  ROM in Degrees  RIGHT LEFT   Hip Flexion WNL WNL   Hip Extension WNL WNL   Hip Abduction WNL WNL   Hip Adduction WNL WNL   Knee Extension WNL WNL   Knee Flexion WNL WNL     Other Measures  Lower Extremity Funtional Score (LEFS): 80/80     Treatments:  Therapeutic exercise x 45 min  Upright bike x 5 min L3  R hip flex stretch, supine x 1 min   AE R hip flex  stretch x 1 min   R step up fwd 8'' 2 x 10   R lateral step 8\" 2 x 10   R kun test hip flex stretch 2 x 30 sec   R hip flex raise 2 x 10 (in kun test position)  B hip 3 way t-band 2 x 10 ea RTB   R hip add squeeze 3 sec hold 2x10   Monster walk RTB 20 ft x 4   Inchworm RTB 20 ft x 4    Leg press 100# 2x10     Updated HEP:  Access Code: [email or web address]  URL: [email or web address]  Date: 12/07/2023  Prepared by: WINNIE Davis    Exercises  - Modified Kun Stretch  - 1 x daily - 7 x weekly - 1 sets - 3 reps - 30 sec hold  - Half Kneeling Hip Flexor Stretch  - 1 x daily - 7 x weekly - " 1 sets - 3 reps - 30 sec hold  - Supine Butterfly Groin Stretch  - 1 x daily - 7 x weekly - 1 sets - 3 reps - 30 sec hold  - Prone Quadriceps Stretch with Strap  - 1 x daily - 7 x weekly - 1 sets - 3 reps - 30 sec hold  - Standing Hamstring Stretch with Step (Mirrored)  - 1 x daily - 7 x weekly - 1 sets - 3 reps - 30 sec hold  - Supine Hip Adduction Isometric with Ball  - 1 x daily - 7 x weekly - 2 sets - 10 reps - 5 sec hold  - Straight Leg Raise  - 1 x daily - 7 x weekly - 2-3 sets - 10 reps  - Hip Abduction with Resistance Loop  - 1 x daily - 4 x weekly - 2 sets - 10 reps  - Hip Extension with Resistance Loop  - 1 x daily - 4 x weekly - 2 sets - 10 reps  - Standing Hip Flexion with Resistance Loop  - 1 x daily - 4 x weekly - 2 sets - 10 reps  - Side Stepping with Resistance at Feet  - 1 x daily - 5 x weekly - 2 sets - 10 reps  - Forward Monster Walks  - 1 x daily - 4 x weekly - 2 sets - 10 reps    Assessment:  Pt overall has progressed well with therapy with no pain remaining in the R hip and groin. Noted to have met all his LTG's (100%) at this time in therapy and is recommended to continue with his HEP. Pt has a good prognosis going forward with HEP.     Plan:  Discharge to HEP today    Goals:  Resolved       PT Problem       Reduce pain at worst to 0-1/10 with all prior activity. (Met)       Start:  10/12/23    Expected End:  11/11/23    Resolved:  12/07/23         Increase hip AROM to WNL all ranges for gait, stairs, self care and ADL's. (Met)       Start:  10/12/23    Expected End:  11/11/23    Resolved:  12/07/23         Pt to increase LE strength to grossly 5/5 for improved gait, stairs, lifting and ADL's. (Met)       Start:  10/12/23    Expected End:  11/11/23    Resolved:  12/07/23         Pt to score 80/80 points on LEFS to display overall increased function.   (Met)       Start:  10/12/23    Expected End:  11/11/23    Resolved:  12/07/23         Pt to be independent with a HEP for self management.  (Met)       Start:  10/12/23    Expected End:  23    Resolved:  23            Physical Therapy    Discharge Summary    Name: Mike Mena  MRN: [id number]  : 1980  Date: 23    Discharge Summary: PT    Discharge Information: Date of discharge 23, Date of last visit 23, Date of evaluation 10/12/23, and Number of attended visits 6    Rehab Discharge Reason: Achieved all and/or the most significant goals(s)

## 2024-01-30 ENCOUNTER — APPOINTMENT (OUTPATIENT)
Dept: PRIMARY CARE | Facility: CLINIC | Age: 44
End: 2024-01-30
Payer: COMMERCIAL

## 2024-02-20 ENCOUNTER — LAB (OUTPATIENT)
Dept: LAB | Facility: LAB | Age: 44
End: 2024-02-20
Payer: COMMERCIAL

## 2024-02-20 ENCOUNTER — OFFICE VISIT (OUTPATIENT)
Dept: PRIMARY CARE | Facility: CLINIC | Age: 44
End: 2024-02-20
Payer: COMMERCIAL

## 2024-02-20 VITALS
WEIGHT: 236.7 LBS | TEMPERATURE: 97.7 F | SYSTOLIC BLOOD PRESSURE: 108 MMHG | OXYGEN SATURATION: 96 % | HEART RATE: 81 BPM | DIASTOLIC BLOOD PRESSURE: 68 MMHG | RESPIRATION RATE: 16 BRPM | BODY MASS INDEX: 33.96 KG/M2

## 2024-02-20 DIAGNOSIS — R20.2 NUMBNESS AND TINGLING: ICD-10-CM

## 2024-02-20 DIAGNOSIS — K21.9 GASTROESOPHAGEAL REFLUX DISEASE, UNSPECIFIED WHETHER ESOPHAGITIS PRESENT: ICD-10-CM

## 2024-02-20 DIAGNOSIS — R20.0 NUMBNESS AND TINGLING: ICD-10-CM

## 2024-02-20 DIAGNOSIS — J45.909 MILD ASTHMA WITHOUT COMPLICATION, UNSPECIFIED WHETHER PERSISTENT (HHS-HCC): ICD-10-CM

## 2024-02-20 DIAGNOSIS — I10 HYPERTENSION, UNSPECIFIED TYPE: Primary | ICD-10-CM

## 2024-02-20 DIAGNOSIS — Z23 IMMUNIZATION DUE: ICD-10-CM

## 2024-02-20 DIAGNOSIS — K21.00 GASTROESOPHAGEAL REFLUX DISEASE WITH ESOPHAGITIS WITHOUT HEMORRHAGE: ICD-10-CM

## 2024-02-20 DIAGNOSIS — Z00.00 HEALTHCARE MAINTENANCE: ICD-10-CM

## 2024-02-20 DIAGNOSIS — F41.9 ANXIETY: ICD-10-CM

## 2024-02-20 DIAGNOSIS — I10 HYPERTENSION, UNSPECIFIED TYPE: ICD-10-CM

## 2024-02-20 DIAGNOSIS — E78.5 HYPERLIPIDEMIA, UNSPECIFIED HYPERLIPIDEMIA TYPE: ICD-10-CM

## 2024-02-20 PROCEDURE — 99214 OFFICE O/P EST MOD 30 MIN: CPT | Performed by: FAMILY MEDICINE

## 2024-02-20 PROCEDURE — 3078F DIAST BP <80 MM HG: CPT | Performed by: FAMILY MEDICINE

## 2024-02-20 PROCEDURE — 85027 COMPLETE CBC AUTOMATED: CPT

## 2024-02-20 PROCEDURE — 83036 HEMOGLOBIN GLYCOSYLATED A1C: CPT

## 2024-02-20 PROCEDURE — 1036F TOBACCO NON-USER: CPT | Performed by: FAMILY MEDICINE

## 2024-02-20 PROCEDURE — 80061 LIPID PANEL: CPT

## 2024-02-20 PROCEDURE — 80053 COMPREHEN METABOLIC PANEL: CPT

## 2024-02-20 PROCEDURE — 90471 IMMUNIZATION ADMIN: CPT | Performed by: FAMILY MEDICINE

## 2024-02-20 PROCEDURE — 3074F SYST BP LT 130 MM HG: CPT | Performed by: FAMILY MEDICINE

## 2024-02-20 PROCEDURE — 36415 COLL VENOUS BLD VENIPUNCTURE: CPT

## 2024-02-20 PROCEDURE — 90686 IIV4 VACC NO PRSV 0.5 ML IM: CPT | Performed by: FAMILY MEDICINE

## 2024-02-20 PROCEDURE — 3008F BODY MASS INDEX DOCD: CPT | Performed by: FAMILY MEDICINE

## 2024-02-20 RX ORDER — LISINOPRIL 5 MG/1
5 TABLET ORAL DAILY
Qty: 90 TABLET | Refills: 3 | Status: SHIPPED | OUTPATIENT
Start: 2024-02-20 | End: 2024-04-17

## 2024-02-20 RX ORDER — OMEPRAZOLE 40 MG/1
40 CAPSULE, DELAYED RELEASE ORAL
Qty: 90 CAPSULE | Refills: 3 | Status: SHIPPED | OUTPATIENT
Start: 2024-02-20 | End: 2024-04-17

## 2024-02-20 RX ORDER — HYDROCHLOROTHIAZIDE 12.5 MG/1
12.5 CAPSULE ORAL EVERY MORNING
Qty: 90 CAPSULE | Refills: 3 | Status: SHIPPED | OUTPATIENT
Start: 2024-02-20 | End: 2024-04-17

## 2024-02-20 NOTE — ASSESSMENT & PLAN NOTE
Prescribed Wixela maintenance inhaler and albuterol rescue inhaler.   Stable, continue with inhalers as prescribed.

## 2024-02-20 NOTE — ASSESSMENT & PLAN NOTE
Cholesterol panel shows genetic type pattern  1/2023 TC/HDL ratio 5.5 (, HDL 34)  Goal TC/HDL ratio 3.4 or less, LDL 99 or less,  or less, and TRIG 150 or less.     Repeat lipid panel ordered today, will be non-fasting so will disregard triglycerides.    To lower this with lifestyle measures:  -make sure you are avoiding refined carbs such as breads, pasta, cereal, candy, soda,  nutrition bars, granola, chips, and sugar sweetened beverages.      -eat 5- 7 servings daily of veggies,  healthy protein such as chicken, fish,  beans, and eggs, and include healthy fats in your diet such as seeds, nuts, olive oil, avocados, and salmon.   -exercise 4 - 6 days per week as you are able, 150 minutes total weekly divided up is recommended.  -consider taking the fiber product psyllium capsules or powder 2 times daily (generic brand is fine) , or a brand name psyllium such as [employer] or Metamucil.  -consider also adding plant stanols and sterols such as Nature Made CholestOff, available over the counter.

## 2024-02-20 NOTE — PROGRESS NOTES
Subjective   Patient ID: Mike Mena is a 44 y.o. male who presents for Follow-up (Pt presents for 6 month follow up- pt states no other concerns at this time.).    HPI     Patient presents today for routine 6 month follow-up.  Patient is doing well overall, they have no new concerns or issues.     ROUTINE VISIT  CHRONIC CONDITIONS:     Hypertension  Current regimen:  HCTZ 12.5 mg (in AM)  Lisinopril 5 mg (in PM)    Medications are being taken and tolerated well.   No side effects are reported.  Patient is taking blood pressure outside of office and reports good control.  Patient denies chest pain, shortness of breath with exertion, palpitations, lower extremity edema, headache, or dizziness.     Hyperlipidemia  Lifestyle managed.  No CACS due to age.  FMH of father with vascular event in 60s and lymphedema.  1/2023 TC/HDL ratio 5.5 (, HDL 34)    Patient denies any facial droop, sudden vision loss, weakness or numbness on one side of body.   Patient denies chest pain, shortness of breath with exertion, palpitations, or symptoms of claudication.     GERD/Schatzki's ring  Taking Omeprazole 40 mg daily.  Last EGD in 10/2021 (Schatzki's ring + dilation, normal stomach, normal duodenum)    Asthma  Prescribed Wixela maintenance inhaler and albuterol rescue inhaler.  Reports breathing baseline, some congestion during this season which is typical for him. No chest pain or palpitations.    Obstructive Sleep Apnea  2/2023 sleep study revealed mild CONSTANTINO with AHI 3% 14.5 and AHI 4% 8.7.    Mood  Hx of anxiety, not presently on medications  Previously discussed medication vs lifestyle measures, patient disinclined to pursue medication at that time and would like to work on lifestyle measures. He reports he has already scheduled with a counselor at that time as well.     9/14/2022 Neuro-psych testing done with Dr. Bernadette Mazariegos  Recommendations:  1. Sleep study to r/o underlying sleep disorder  2. Outpatient psychotherapy  for regulation of emotions and stress.  3. Practice linking behaviors  4. Have a steady routine  5. Alternating high and low interest tasks.  6. Taking regular breaks.  7. Books, The Smart but Scattered Guide to Success by Tylor and [employer] & [employer] for People with ADHD by Bull  8. Repeat in 12-24 months to monitor for changes.    Today he reports he is unemployed but looking at this in positive light because he gone from a job he no longer liked. Going to take time and expand to apply for more jobs. Plans to go for walks, do yoga. Does get anxious sometimes, ANGELICA told him about tapping, grounding, etc which are helpful for him. Feels he is handling this life change appropriately, would like to stay the course on his own.    Review of Systems   All other systems reviewed and are negative.      Objective   /68 (BP Location: Left arm, Patient Position: Sitting, BP Cuff Size: Large adult)   Pulse 81   Temp 36.5 °C (97.7 °F) (Temporal)   Resp 16   Wt 107 kg (236 lb 11.2 oz)   SpO2 96%   BMI 33.96 kg/m²     Physical Exam  Vitals and nursing note reviewed.   Constitutional:       General: He is not in acute distress.     Appearance: Normal appearance. He is not toxic-appearing.   HENT:      Head: Normocephalic and atraumatic.   Eyes:      Pupils: Pupils are equal, round, and reactive to light.   Cardiovascular:      Rate and Rhythm: Normal rate and regular rhythm.      Heart sounds: No murmur heard.     No friction rub. No gallop.   Pulmonary:      Effort: Pulmonary effort is normal.      Breath sounds: Normal breath sounds. No wheezing, rhonchi or rales.   Musculoskeletal:      Right lower leg: No edema.      Left lower leg: No edema.   Neurological:      General: No focal deficit present.      Mental Status: He is alert and oriented to person, place, and time.   Psychiatric:         Mood and Affect: Mood normal.         Behavior: Behavior normal.         Assessment/Plan   Problem List Items  Addressed This Visit             ICD-10-CM    Anxiety F41.9     Mood is doing well without medications.  Patient to keep up with his lifestyle efforts.  I strongly endorse walks, yoga, tapping, etc as you have been doing.  See recommendations for nutrition and exercise.    LIFESTYLE MEASURES  -make sure you are avoiding refined carbs such as breads, pasta, cereal, candy, soda,  nutrition bars, granola, chips, and sugar sweetened beverages.      -eat 5- 7 servings daily of veggies,  healthy protein such as chicken, fish,  beans, and eggs, and include healthy fats in your diet such as seeds, nuts, olive oil, avocados, and salmon.   -exercise 4 - 6 days per week as you are able, 150 minutes total weekly divided up is recommended.  -Vitamin D is recommended at 1000 - 5000 IU international units daily.   -64 oz of water is recommended daily.         Asthma J45.909     Prescribed Wixela maintenance inhaler and albuterol rescue inhaler.   Stable, continue with inhalers as prescribed.         Esophageal reflux K21.9     Stable, continue Omeprazole 40 mg daily.         Hyperlipidemia E78.5     Cholesterol panel shows genetic type pattern  1/2023 TC/HDL ratio 5.5 (, HDL 34)  Goal TC/HDL ratio 3.4 or less, LDL 99 or less,  or less, and TRIG 150 or less.     Repeat lipid panel ordered today, will be non-fasting so will disregard triglycerides.    To lower this with lifestyle measures:  -make sure you are avoiding refined carbs such as breads, pasta, cereal, candy, soda,  nutrition bars, granola, chips, and sugar sweetened beverages.      -eat 5- 7 servings daily of veggies,  healthy protein such as chicken, fish,  beans, and eggs, and include healthy fats in your diet such as seeds, nuts, olive oil, avocados, and salmon.   -exercise 4 - 6 days per week as you are able, 150 minutes total weekly divided up is recommended.  -consider taking the fiber product psyllium capsules or powder 2 times daily (generic brand is  fine) , or a brand name psyllium such as [employer] [facility] [employer] or Metamucil.  -consider also adding plant stanols and sterols such as Nature Made CholestOff, available over the counter.          Hypertension - Primary I10     BP is 108/68 in office today, good control.  Continue HCTZ 12.5 mg (in AM) daily   Continue Lisinopril 5 mg (in PM) daily.  Routine CMP ordered today.         Healthcare maintenance Z00.00     Last CPE in June 2023  Lab orders only in Feb 2024  Will completed physical visit at routine 6 month follow-up.         Relevant Orders    Lipid Panel    CBC    Comprehensive Metabolic Panel    Numbness and tingling R20.0, R20.2     Intermittent, worse with sugar intake  Will check A1c with routine labs (nonfasting on 02/20/2024).         Relevant Orders    Hemoglobin A1C     Other Visit Diagnoses         Codes    Immunization due     Z23    Relevant Orders    Flu vaccine (IIV4) age 6 months and greater, preservative free (Completed)          Non-fasting labs to be completed today.    Follow-up in 6 months for routine care + CPE.  Call for sooner follow-up if needed.       Scribe Attestation  By signing my name below, IJulissa Scribe   attest that this documentation has been prepared under the direction and in the presence of Lillian Miner DO.

## 2024-02-20 NOTE — ASSESSMENT & PLAN NOTE
Mood is doing well without medications.  Patient to keep up with his lifestyle efforts.  I strongly endorse walks, yoga, tapping, etc as you have been doing.  See recommendations for nutrition and exercise.    LIFESTYLE MEASURES  -make sure you are avoiding refined carbs such as breads, pasta, cereal, candy, soda,  nutrition bars, granola, chips, and sugar sweetened beverages.      -eat 5- 7 servings daily of veggies,  healthy protein such as chicken, fish,  beans, and eggs, and include healthy fats in your diet such as seeds, nuts, olive oil, avocados, and salmon.   -exercise 4 - 6 days per week as you are able, 150 minutes total weekly divided up is recommended.  -Vitamin D is recommended at 1000 - 5000 IU international units daily.   -64 oz of water is recommended daily.

## 2024-02-20 NOTE — ASSESSMENT & PLAN NOTE
Intermittent, worse with sugar intake  Will check A1c with routine labs (nonfasting on 02/20/2024).

## 2024-02-20 NOTE — ASSESSMENT & PLAN NOTE
BP is 108/68 in office today, good control.  Continue HCTZ 12.5 mg (in AM) daily   Continue Lisinopril 5 mg (in PM) daily.  Routine CMP ordered today.

## 2024-02-20 NOTE — ASSESSMENT & PLAN NOTE
Last CPE in June 2023  Lab orders only in Feb 2024  Will completed physical visit at routine 6 month follow-up.

## 2024-02-21 LAB
ALBUMIN SERPL BCP-MCNC: 4.2 G/DL (ref 3.4–5)
ALP SERPL-CCNC: 75 U/L (ref 33–120)
ALT SERPL W P-5'-P-CCNC: 41 U/L (ref 10–52)
ANION GAP SERPL CALC-SCNC: 13 MMOL/L (ref 10–20)
AST SERPL W P-5'-P-CCNC: 26 U/L (ref 9–39)
BILIRUB SERPL-MCNC: 0.7 MG/DL (ref 0–1.2)
BUN SERPL-MCNC: 10 MG/DL (ref 6–23)
CALCIUM SERPL-MCNC: 9.6 MG/DL (ref 8.6–10.6)
CHLORIDE SERPL-SCNC: 98 MMOL/L (ref 98–107)
CHOLEST SERPL-MCNC: 168 MG/DL (ref 0–199)
CHOLESTEROL/HDL RATIO: 5
CO2 SERPL-SCNC: 32 MMOL/L (ref 21–32)
CREAT SERPL-MCNC: 0.71 MG/DL (ref 0.5–1.3)
EGFRCR SERPLBLD CKD-EPI 2021: >90 ML/MIN/1.73M*2
ERYTHROCYTE [DISTWIDTH] IN BLOOD BY AUTOMATED COUNT: 12.3 % (ref 11.5–14.5)
EST. AVERAGE GLUCOSE BLD GHB EST-MCNC: 111 MG/DL
GLUCOSE SERPL-MCNC: 85 MG/DL (ref 74–99)
HBA1C MFR BLD: 5.5 %
HCT VFR BLD AUTO: 46.6 % (ref 41–52)
HDLC SERPL-MCNC: 33.5 MG/DL
HGB BLD-MCNC: 15.5 G/DL (ref 13.5–17.5)
LDLC SERPL CALC-MCNC: 113 MG/DL
MCH RBC QN AUTO: 28.9 PG (ref 26–34)
MCHC RBC AUTO-ENTMCNC: 33.3 G/DL (ref 32–36)
MCV RBC AUTO: 87 FL (ref 80–100)
NON HDL CHOLESTEROL: 135 MG/DL (ref 0–149)
NRBC BLD-RTO: 0 /100 WBCS (ref 0–0)
PLATELET # BLD AUTO: 212 X10*3/UL (ref 150–450)
POTASSIUM SERPL-SCNC: 3.8 MMOL/L (ref 3.5–5.3)
PROT SERPL-MCNC: 6.8 G/DL (ref 6.4–8.2)
RBC # BLD AUTO: 5.36 X10*6/UL (ref 4.5–5.9)
SODIUM SERPL-SCNC: 139 MMOL/L (ref 136–145)
TRIGL SERPL-MCNC: 110 MG/DL (ref 0–149)
VLDL: 22 MG/DL (ref 0–40)
WBC # BLD AUTO: 7.1 X10*3/UL (ref 4.4–11.3)

## 2024-04-17 DIAGNOSIS — I10 HYPERTENSION, UNSPECIFIED TYPE: ICD-10-CM

## 2024-04-17 DIAGNOSIS — K21.00 GASTROESOPHAGEAL REFLUX DISEASE WITH ESOPHAGITIS WITHOUT HEMORRHAGE: ICD-10-CM

## 2024-04-17 RX ORDER — LISINOPRIL 5 MG/1
5 TABLET ORAL DAILY
Qty: 90 TABLET | Refills: 3 | Status: SHIPPED | OUTPATIENT
Start: 2024-04-17 | End: 2025-04-17

## 2024-04-17 RX ORDER — HYDROCHLOROTHIAZIDE 12.5 MG/1
12.5 CAPSULE ORAL EVERY MORNING
Qty: 90 CAPSULE | Refills: 3 | Status: SHIPPED | OUTPATIENT
Start: 2024-04-17 | End: 2025-04-17

## 2024-04-17 RX ORDER — OMEPRAZOLE 40 MG/1
40 CAPSULE, DELAYED RELEASE ORAL
Qty: 90 CAPSULE | Refills: 3 | Status: SHIPPED | OUTPATIENT
Start: 2024-04-17 | End: 2025-04-17

## 2024-08-20 ENCOUNTER — APPOINTMENT (OUTPATIENT)
Dept: PRIMARY CARE | Facility: CLINIC | Age: 44
End: 2024-08-20
Payer: COMMERCIAL

## 2024-11-25 DIAGNOSIS — J45.909 MILD ASTHMA WITHOUT COMPLICATION, UNSPECIFIED WHETHER PERSISTENT (HHS-HCC): ICD-10-CM

## 2024-11-25 NOTE — TELEPHONE ENCOUNTER
"Pawel Mena \"Mike\"  [address] [place] [address] Clinical Support Staff  Phone Number: [phone]     Will need to have this prescription renewed before our visit next month.    Thank you,    Pawel Mena"

## 2024-11-26 DIAGNOSIS — J45.909 MILD ASTHMA WITHOUT COMPLICATION, UNSPECIFIED WHETHER PERSISTENT (HHS-HCC): ICD-10-CM

## 2024-11-26 RX ORDER — FLUTICASONE PROPIONATE AND SALMETEROL 100; 50 UG/1; UG/1
1 POWDER RESPIRATORY (INHALATION)
Qty: 180 EACH | Refills: 1 | Status: SHIPPED | OUTPATIENT
Start: 2024-11-26

## 2024-11-26 RX ORDER — FLUTICASONE PROPIONATE AND SALMETEROL 100; 50 UG/1; UG/1
1 POWDER RESPIRATORY (INHALATION)
Qty: 180 EACH | Refills: 3 | Status: SHIPPED | OUTPATIENT
Start: 2024-11-26 | End: 2024-11-26 | Stop reason: SDUPTHER

## 2024-12-10 ENCOUNTER — APPOINTMENT (OUTPATIENT)
Dept: PRIMARY CARE | Facility: CLINIC | Age: 44
End: 2024-12-10

## 2024-12-10 VITALS
HEART RATE: 77 BPM | SYSTOLIC BLOOD PRESSURE: 121 MMHG | DIASTOLIC BLOOD PRESSURE: 73 MMHG | OXYGEN SATURATION: 94 % | BODY MASS INDEX: 35.66 KG/M2 | WEIGHT: 248.5 LBS | TEMPERATURE: 97.8 F

## 2024-12-10 DIAGNOSIS — G47.33 OSA (OBSTRUCTIVE SLEEP APNEA): ICD-10-CM

## 2024-12-10 DIAGNOSIS — E78.5 HYPERLIPIDEMIA, UNSPECIFIED HYPERLIPIDEMIA TYPE: ICD-10-CM

## 2024-12-10 DIAGNOSIS — Z79.899 MEDICATION MANAGEMENT: ICD-10-CM

## 2024-12-10 DIAGNOSIS — I10 HYPERTENSION, UNSPECIFIED TYPE: Primary | ICD-10-CM

## 2024-12-10 DIAGNOSIS — Z13.6 SCREENING FOR CARDIOVASCULAR CONDITION: ICD-10-CM

## 2024-12-10 DIAGNOSIS — R60.0 BILATERAL LOWER EXTREMITY EDEMA: ICD-10-CM

## 2024-12-10 DIAGNOSIS — F41.9 ANXIETY: ICD-10-CM

## 2024-12-10 DIAGNOSIS — J45.30 MILD PERSISTENT ASTHMA WITHOUT COMPLICATION (HHS-HCC): ICD-10-CM

## 2024-12-10 DIAGNOSIS — K21.9 GASTROESOPHAGEAL REFLUX DISEASE, UNSPECIFIED WHETHER ESOPHAGITIS PRESENT: ICD-10-CM

## 2024-12-10 PROBLEM — R10.31 RIGHT GROIN PAIN: Status: RESOLVED | Noted: 2023-10-12 | Resolved: 2024-12-10

## 2024-12-10 PROBLEM — M79.604 RIGHT LEG PAIN: Status: RESOLVED | Noted: 2023-10-12 | Resolved: 2024-12-10

## 2024-12-10 PROCEDURE — 3074F SYST BP LT 130 MM HG: CPT | Performed by: FAMILY MEDICINE

## 2024-12-10 PROCEDURE — 3078F DIAST BP <80 MM HG: CPT | Performed by: FAMILY MEDICINE

## 2024-12-10 PROCEDURE — 99214 OFFICE O/P EST MOD 30 MIN: CPT | Performed by: FAMILY MEDICINE

## 2024-12-10 PROCEDURE — 1036F TOBACCO NON-USER: CPT | Performed by: FAMILY MEDICINE

## 2024-12-10 RX ORDER — HYDROCHLOROTHIAZIDE 25 MG/1
25 TABLET ORAL DAILY
Qty: 30 TABLET | Refills: 5 | Status: SHIPPED | OUTPATIENT
Start: 2024-12-10 | End: 2024-12-10

## 2024-12-10 RX ORDER — HYDROCHLOROTHIAZIDE 25 MG/1
25 TABLET ORAL DAILY
Qty: 30 TABLET | Refills: 5 | Status: SHIPPED | OUTPATIENT
Start: 2024-12-10 | End: 2025-06-08

## 2024-12-10 NOTE — PATIENT INSTRUCTIONS
Asthma - go up from 100/50 to 250/50 advair     Use albut 1 - 2 puffs every 4 hours as needed     Mood  Hx of anxiety, not presently on medications     Previously discussed medication vs lifestyle measures, patient disinclined to pursue medication at that time and would like to work on lifestyle measures. He reports he has already scheduled with a counselor at that time as well.     9/14/2022 Neuro-psych testing done with Dr. Bernadette Mazariegos  Recommendations:  1. Sleep study to r/o underlying sleep disorder  2. Outpatient psychotherapy for regulation of emotions and stress.  3. Practice linking behaviors  4. Have a steady routine  5. Alternating high and low interest tasks.  6. Taking regular breaks.  7. Books, The Smart but Scattered Guide to Success by Tylor and [employer] for People with ADHD by Bull  8. Repeat in 12-24 months to monitor for changes      3 - 4 weeks recheck blood work     We recommend limitation of refined carbohydrates such as pasta, pretzels, breads, , sugar -sweetened foods or beverages, alcohol, pastries, cereals, or bagels.   We do recommend eating lots of vegetables- 5 -7 servings of veggies daily. Eat lean proteins, and some fruits.  Eat small amounts of healthy fat daily, such as a handful of almonds, walnuts, pumpkin seeds, a serving of salmon, a splash of olive oil on your salad, an avocado.       Healthy nutritional choices decrease conditions like elevated cholesterol, elevated sugars, elevated weight, elevated blood pressure, elevated inflammation.    Healthy choices can also lower risk of events such as strokes, heart attacks, and cancer.     Make most of your food intake plant- based.   Have occasional treats if you like, but try not to overindulge.     Some sort of heart-rate increasing movement or exercise is recommended 4 - 6 days per week, even if in 5 or 10 min increments  several times throughout the day.     INTERMITTENT FASTING  You may want to consider  intermittent fasting.    There are several different approaches to this, but a straightforward and fairly easy one is to refrain from eating any calories for 12 hours every day. For example, from 8 PM until 8 AM, have nothing but water, or black coffee or tea in the morning. No cream or sugar should be used if you have coffee or tea. Make sure you have adequate fluid intake during this time, and throughout the day. This. A fast demands that your body pull upon stores of visceral fat for energy. This can help improve sugar readings, can improve cholesterol profiles, can decrease waist circumference, and can contribute to weight loss.    If after one month of 12 hour intermittent fasting, you feel comfortable increasing the hours, you may go for 14 hours, and even up to 16 hours. For example, stopped eating at 8 PM and don't eat again until noon the next day, breaking the fast at lunchtime. Snacking during the period of fast will break the fast and will not result in the same outcomes.    It is not recommended that you go beyond 16 hours of fasting.    Non-CPAP sleep apnea treatment -     A custom oral  appliance can be an effective alternative to CPAP for treatment of sleep apnea .    These devices are custom fitted by dentists with special expertise and training in treating sleep apnea. They fit in the mouth, similar to a retainer or mouth guard. They open your airway by moving your lower jaw and tongue forward while you sleep. They are small, comfortable and effective.    One such provider in our area is Dr. Silva Akins,  BDS, DMD, Diplomate, ABDSM.    She practices at [place] [facility] [employer] [facility].    She is taking new patients and can set up a consultation to discuss further.     Phone [phone]  Fax: [phone] [address] [facility] , [place] [address]  Office Hours  Monday-Thursday: 9:00am-5:00pm      Coronary artery calcium score review   w dr carrington,  fluid retention check in 4 mo

## 2024-12-10 NOTE — ASSESSMENT & PLAN NOTE
Hx of GERD/Schatzki's ring taking Omeprazole 40 mg daily.  Last EGD in 10/2021 (Schatzki's ring + dilation, normal stomach, normal duodenum)    Reports some occasional reflux but no dysphagia nor choking.  Continue on Omeprazole as prescribed  Fie to take 1-2 extra strength Tums or Rolaids as needed to help quiet down reflux.  See below for lifestyle recommendations  Advised to return to GI if reflux sx persist/worsens or develops difficulty swallowing/stuff getting stuck.    Other non-prescription anti-reflux measures:  Avoid citrus, tomatoes, alcohol, caffeine, chocolate, onions, garlic, salt, peppermint  Avoid large meals   Avoid laying down for 3-4 hours following meals   Avoid tight clothing around the waist  Elevate head of bed 4-8 inches   Weight loss can help

## 2024-12-10 NOTE — ASSESSMENT & PLAN NOTE
Not using CPAP, has never tried. Having some increased weight, fluid retention. Patient advised that uncontrolled sleep apnea can lead to fatigue, daytime somnolence, weight gain, hypertension, and fluid retention. Continues to be disinclined to pursue CPAP therapy, opts to try positional changes (elevating head of bed) and weight reduction.

## 2024-12-10 NOTE — ASSESSMENT & PLAN NOTE
Mood is doing well without medications per patient report. Continue with lifestyle management as discussed. Patient to reach out for any mood issues should needs arise.     9/14/2022 Neuro-psych testing done with Dr. Bernadette Mazariegos  Recommendations:  1. Sleep study to r/o underlying sleep disorder  2. Outpatient psychotherapy for regulation of emotions and stress.  3. Practice linking behaviors  4. Have a steady routine  5. Alternating high and low interest tasks.  6. Taking regular breaks.  7. Books, The Smart but Scattered Guide to Success by Tylor and [employer] & [employer] for People with ADHD by Bull  8. Repeat in 12-24 months to monitor for changes.

## 2024-12-10 NOTE — PROGRESS NOTES
Subjective   Patient ID: Mike Mena is a 44 y.o. male who presents for Follow-up (Pt presents for 6 month follow up- pt states no concerns/questions).    HPI     Patient presents today for routine follow-up.    Patient concerns:  No new concerns or issues. Patient is doing well overall.     ROUTINE VISIT  CHRONIC CONDITIONS:     Hypertension  Current regimen:  HCTZ 12.5 mg (in AM)  Lisinopril 5 mg (in PM)    Medications are being taken and tolerated well. No side effects are reported.  Having some fluid retention, normal for him. Father with hx of lymphedema.  Patient denies chest pain, shortness of breath with exertion, palpitations, headache, or dizziness.     Hyperlipidemia  Lifestyle managed.  FMH of father with vascular event in 60s and lymphedema.    1/2023 TC/HDL ratio 5.5 (, HDL 34)  2/2024 TC/HDL ratio 5.0 (, HDL 33)    Patient denies any facial droop, sudden vision loss, weakness or numbness on one side of body.   Patient denies chest pain, shortness of breath with exertion, palpitations, or symptoms of claudication.     GERD/Schatzki's ring  Taking Omeprazole 40 mg daily.  Last EGD in 10/2021 (Schatzki's ring + dilation, normal stomach, normal duodenum)    Having some reflux occasionally but no issues with dysphagia or choking.  Feels doing fine on med, disinclined to return to GI at this time.    Asthma  Prescribed Wixela maintenance inhaler and albuterol rescue inhaler.  Continues inhalers as prescribed, not needing albuterol more than twice weekly  Occasionally has some tightness, wheezing but typically more with acute illnesses.  Does think could benefit from increase in dose of maintenance inhaler.  Some congestion at this time but no respiratory complaints    Obstructive Sleep Apnea  2/2023 sleep study revealed mild CONSTANTINO with AHI 3% 14.5 and AHI 4% 8.7.  7/2023 OV with PCP patient reports has not gotten CPAP or started the process of getting one. He would like to table this for now,  opts to work on lifestyle efforts.    Mood  Hx of anxiety, not presently on medications    Previously discussed medication vs lifestyle measures, patient disinclined to pursue medication at that time and would like to work on lifestyle measures. He reports he has already scheduled with a counselor at that time as well.     9/14/2022 Neuro-psych testing done with Dr. Bernadette Mazariegos  Recommendations:  1. Sleep study to r/o underlying sleep disorder  2. Outpatient psychotherapy for regulation of emotions and stress.  3. Practice linking behaviors  4. Have a steady routine  5. Alternating high and low interest tasks.  6. Taking regular breaks.  7. Books, The Smart but Scattered Guide to Success by [employer] and [employer] & [employer] for People with ADHD by [employer]  8. Repeat in 12-24 months to monitor for changes.      Has new job  Feels mood doing well off meds at this time    Review of Systems   All other systems reviewed and are negative.      Objective   /73 (BP Location: Left arm, Patient Position: Sitting, BP Cuff Size: Large adult)   Pulse 77   Temp 36.6 °C (97.8 °F) (Temporal)   Wt 113 kg (248 lb 8 oz)   SpO2 94%   BMI 35.66 kg/m²     Physical Exam  Vitals and nursing note reviewed.   Constitutional:       General: He is not in acute distress.     Appearance: Normal appearance. He is not toxic-appearing.   HENT:      Head: Normocephalic and atraumatic.      Right Ear: Tympanic membrane normal.      Left Ear: Tympanic membrane normal.      Mouth/Throat:      Mouth: Mucous membranes are moist.      Pharynx: Oropharynx is clear.   Eyes:      Pupils: Pupils are equal, round, and reactive to light.   Cardiovascular:      Rate and Rhythm: Normal rate and regular rhythm.      Heart sounds: Normal heart sounds. No murmur heard.     No friction rub. No gallop.   Pulmonary:      Effort: Pulmonary effort is normal.      Breath sounds: Wheezing (slight wheeze with forced expiration proximal trachea region only)  present. No rhonchi or rales.   Musculoskeletal:      Right lower leg: Edema (trace) present.      Left lower leg: Edema (trace) present.   Lymphadenopathy:      Cervical: No cervical adenopathy.   Skin:     General: Skin is warm and dry.   Neurological:      General: No focal deficit present.      Mental Status: He is alert and oriented to person, place, and time.   Psychiatric:         Mood and Affect: Mood normal.         Behavior: Behavior normal.         Assessment/Plan   Problem List Items Addressed This Visit             ICD-10-CM    Anxiety F41.9     Mood is doing well without medications per patient report. Continue with lifestyle management as discussed. Patient to reach out for any mood issues should needs arise.     9/14/2022 Neuro-psych testing done with Dr. Bernadette Mazariegos  Recommendations:  1. Sleep study to r/o underlying sleep disorder  2. Outpatient psychotherapy for regulation of emotions and stress.  3. Practice linking behaviors  4. Have a steady routine  5. Alternating high and low interest tasks.  6. Taking regular breaks.  7. Books, The Smart but Scattered Guide to Success by [employer]yl[employer] and [employer] & [employer] for People with ADHD by [employer]  8. Repeat in 12-24 months to monitor for changes.         Asthma J45.909     Currently prescribed Wixela maintenance inhaler and albuterol rescue inhaler. He is tolerating the Wixela but does feel he would benefit from increased dose. Having some occasional wheezing and tightness but mostly around times he is experiencing acute respiratory illnesses. No requiring albuterol more than twice week on consistent basis. No SOB, chest tightness or wheezing reported today. Lungs clear on exam.    Continue Wixela maintenance inhaler daily, dose increased today.    Continue albuterol PRN.  You may use one to two puffs every four hours as needed for wheeze, cough, or chest tightness, or for use 15 minutes prior to activity if exercise is triggering your symptoms.           CONSTANTINO (obstructive sleep apnea) G47.33     Not using CPAP, has never tried. Having some increased weight, fluid retention. Patient advised that uncontrolled sleep apnea can lead to fatigue, daytime somnolence, weight gain, hypertension, and fluid retention. Continues to be disinclined to pursue CPAP therapy, opts to try positional changes (elevating head of bed) and weight reduction.         Esophageal reflux K21.9     Hx of GERD/Schatzki's ring taking Omeprazole 40 mg daily.  Last EGD in 10/2021 (Schatzki's ring + dilation, normal stomach, normal duodenum)    Reports some occasional reflux but no dysphagia nor choking.  Continue on Omeprazole as prescribed  Fie to take 1-2 extra strength Tums or Rolaids as needed to help quiet down reflux.  See below for lifestyle recommendations  Advised to return to GI if reflux sx persist/worsens or develops difficulty swallowing/stuff getting stuck.    Other non-prescription anti-reflux measures:  Avoid citrus, tomatoes, alcohol, caffeine, chocolate, onions, garlic, salt, peppermint  Avoid large meals   Avoid laying down for 3-4 hours following meals   Avoid tight clothing around the waist  Elevate head of bed 4-8 inches   Weight loss can help          Hyperlipidemia E78.5     1/2023 TC/HDL ratio 5.5 (, HDL 34)  2/2024 TC/HDL ratio 5.0 (, HDL 33)  Goal TC/HDL ratio 3.4 or less, LDL 99 or less,  or less, and TRIG 150 or less.     Cholesterol panel shows genetic type pattern  FMH of father with vascular event in 60s and lymphedema.    Currently lifestyle managed  Diet and exercise recommendations revisited.     CT cardiac scoring ordered today given father's cardiac history.    We discussed ordering a coronary artery calcium score to better determine how to treat your elevated cholesterol. This is a CT scan to determine if you have calcified plaque in your coronary arteries. Coronary artery calcium scores are used to help us determine how to best treat  your elevated cholesterol.  The risks of this screen is we may find things that we are not looking for that we will have to follow up on such as lung nodules which are very common in [facility] fatty liver which is common in individuals that are overweight. There is also risk of radiation exposure. Follow up visit will be scheduled to review coronary artery calcium score, if needed.          Hypertension - Primary I10     BP is 121/73 in office today. Goal BP is 130/80 or less, ideally 120/80 or less.     Although BP under good control I have recommended he increase his diuretic (HCTZ) secondary to fluid retention. Patient agreeable with this plan.    Increase HCTZ from 12.5 to 25 mg (in AM) daily   High potassium diet reviewed, handout provided  Repeat metabolic panel to be done 3-4 weeks after starting.    Continue Lisinopril 5 mg (in PM) daily.    Continue home monitoring.         Relevant Medications    hydroCHLOROthiazide (HYDRODiuril) 25 mg tablet    Other Relevant Orders    Basic Metabolic Panel    Bilateral lower extremity edema R60.0     BP well controlled but having fluid retention  Increase HCTZ from 12.5 to 25 mg daily, see HTN a/p section for recs  Repeat BMP in 3-4 weeks. Ordered.    To help with swelling in lower legs I encourage you to wear compression stockings (the kind that goes up to your knees). I recommend 10-15 or 15-20 mmHg stockings to help with swelling. I recommend VIVE compression stockings, which can be purchased on [employer].            Other Visit Diagnoses         Codes    Screening for cardiovascular condition     Z13.6    Relevant Orders    CT cardiac scoring wo IV contrast    Medication management     Z79.899    Relevant Orders    Basic Metabolic Panel            Follow-up in 3-4 months for recheck reflux, edema, HTN, HLD + review CT cardiac scoring.  Call for sooner follow-up if needed.         Scribe Attestation  By signing my name below, IJulissa, Lesa   attest that  this documentation has been prepared under the direction and in the presence of Lillian Miner DO.

## 2024-12-10 NOTE — ASSESSMENT & PLAN NOTE
BP is 121/73 in office today. Goal BP is 130/80 or less, ideally 120/80 or less.     Although BP under good control I have recommended he increase his diuretic (HCTZ) secondary to fluid retention. Patient agreeable with this plan.    Increase HCTZ from 12.5 to 25 mg (in AM) daily   High potassium diet reviewed, handout provided  Repeat metabolic panel to be done 3-4 weeks after starting.    Continue Lisinopril 5 mg (in PM) daily.    Continue home monitoring.

## 2024-12-10 NOTE — ASSESSMENT & PLAN NOTE
Currently prescribed Wixela maintenance inhaler and albuterol rescue inhaler. He is tolerating the Wixela but does feel he would benefit from increased dose. Having some occasional wheezing and tightness but mostly around times he is experiencing acute respiratory illnesses. No requiring albuterol more than twice week on consistent basis. No SOB, chest tightness or wheezing reported today. Lungs clear on exam.    Continue Wixela maintenance inhaler daily, dose increased today.    Continue albuterol PRN.  You may use one to two puffs every four hours as needed for wheeze, cough, or chest tightness, or for use 15 minutes prior to activity if exercise is triggering your symptoms.

## 2024-12-10 NOTE — ASSESSMENT & PLAN NOTE
1/2023 TC/HDL ratio 5.5 (, HDL 34)  2/2024 TC/HDL ratio 5.0 (, HDL 33)  Goal TC/HDL ratio 3.4 or less, LDL 99 or less,  or less, and TRIG 150 or less.     Cholesterol panel shows genetic type pattern  FMH of father with vascular event in 60s and lymphedema.    Currently lifestyle managed  Diet and exercise recommendations revisited.     CT cardiac scoring ordered today given father's cardiac history.    We discussed ordering a coronary artery calcium score to better determine how to treat your elevated cholesterol. This is a CT scan to determine if you have calcified plaque in your coronary arteries. Coronary artery calcium scores are used to help us determine how to best treat your elevated cholesterol.  The risks of this screen is we may find things that we are not looking for that we will have to follow up on such as lung nodules which are very common in [facility] fatty liver which is common in individuals that are overweight. There is also risk of radiation exposure. Follow up visit will be scheduled to review coronary artery calcium score, if needed.

## 2024-12-10 NOTE — ASSESSMENT & PLAN NOTE
BP well controlled but having fluid retention  Increase HCTZ from 12.5 to 25 mg daily, see HTN a/p section for recs  Repeat BMP in 3-4 weeks. Ordered.    To help with swelling in lower legs I encourage you to wear compression stockings (the kind that goes up to your knees). I recommend 10-15 or 15-20 mmHg stockings to help with swelling. I recommend VIVE compression stockings, which can be purchased on [employer].

## 2025-03-21 ENCOUNTER — OFFICE VISIT (OUTPATIENT)
Dept: URGENT CARE | Age: 45
End: 2025-03-21
Payer: COMMERCIAL

## 2025-03-21 VITALS
RESPIRATION RATE: 20 BRPM | WEIGHT: 232 LBS | BODY MASS INDEX: 32.48 KG/M2 | TEMPERATURE: 98.2 F | SYSTOLIC BLOOD PRESSURE: 153 MMHG | HEART RATE: 63 BPM | DIASTOLIC BLOOD PRESSURE: 93 MMHG | HEIGHT: 71 IN | OXYGEN SATURATION: 98 %

## 2025-03-21 DIAGNOSIS — R31.9 HEMATURIA, UNSPECIFIED TYPE: ICD-10-CM

## 2025-03-21 DIAGNOSIS — M54.9 COSTOVERTEBRAL ANGLE TENDERNESS: Primary | ICD-10-CM

## 2025-03-21 DIAGNOSIS — M54.6 ACUTE RIGHT-SIDED THORACIC BACK PAIN: ICD-10-CM

## 2025-03-21 LAB
POC APPEARANCE, URINE: ABNORMAL
POC BILIRUBIN, URINE: NEGATIVE
POC BLOOD, URINE: ABNORMAL
POC COLOR, URINE: ABNORMAL
POC GLUCOSE, URINE: NEGATIVE MG/DL
POC KETONES, URINE: NEGATIVE MG/DL
POC LEUKOCYTES, URINE: NEGATIVE
POC NITRITE,URINE: NEGATIVE
POC PH, URINE: 6 PH
POC PROTEIN, URINE: NEGATIVE MG/DL
POC SPECIFIC GRAVITY, URINE: 1.01
POC UROBILINOGEN, URINE: 0.2 EU/DL

## 2025-03-21 ASSESSMENT — ENCOUNTER SYMPTOMS
BLOOD IN STOOL: 0
FATIGUE: 0
BACK PAIN: 1
ACTIVITY CHANGE: 1
CARDIOVASCULAR NEGATIVE: 1
HEMATURIA: 1
DIARRHEA: 0
PSYCHIATRIC NEGATIVE: 1
NAUSEA: 1
RESPIRATORY NEGATIVE: 1
VOMITING: 0
FLANK PAIN: 1
EYES NEGATIVE: 1
FEVER: 0
ABDOMINAL PAIN: 0

## 2025-03-21 ASSESSMENT — PAIN SCALES - GENERAL: PAINLEVEL_OUTOF10: 9

## 2025-03-21 NOTE — PROGRESS NOTES
"Subjective   Patient ID: Pawel Mena \"Dario" is a 45 y.o. male. They present today with a chief complaint of Back Pain.    History of Present Illness  History of kidney surgery for kidney deformity. C/o intense R back pain that started approx 1 hour ago. Rates 9/10 constant pain. Denies h/o kidney stones. Has not tried OTC meds. Denies any syncope, LOC, CP, SOB, HA, fever, abdominal pain, and vomiting otherwise.      History provided by:  Patient  Back Pain  Pertinent negatives include no abdominal pain or fever.       Past Medical History  Allergies as of 03/21/2025 - Reviewed 03/21/2025   Allergen Reaction Noted    Penicillins Unknown 05/08/2002       (Not in a hospital admission)       Past Medical History:   Diagnosis Date    Asthma        Past Surgical History:   Procedure Laterality Date    KIDNEY SURGERY      PUV surgery        reports that he has never smoked. He has never used smokeless tobacco. He reports current alcohol use of about 1.0 standard drink of alcohol per week. He reports that he does not use drugs.    Review of Systems  Review of Systems   Constitutional:  Positive for activity change. Negative for fatigue and fever.   HENT: Negative.     Eyes: Negative.    Respiratory: Negative.     Cardiovascular: Negative.    Gastrointestinal:  Positive for nausea. Negative for abdominal pain, blood in stool, diarrhea and vomiting.   Genitourinary:  Positive for flank pain and hematuria. Negative for scrotal swelling, testicular pain and urgency.   Musculoskeletal:  Positive for back pain and gait problem.   Skin: Negative.    Psychiatric/Behavioral: Negative.     All other systems reviewed and are negative.                                 Objective    Vitals:    03/21/25 1704   BP: (!) 153/93   Pulse: 63   Resp: 20   Temp: 36.8 °C (98.2 °F)   TempSrc: Oral   SpO2: 98%   Weight: 105 kg (232 lb)   Height: 1.803 m (5' 11\")     No LMP for male patient.    Physical Exam    Procedures    Point of Care Test & " Imaging Results from this visit  Results for orders placed or performed in visit on 03/21/25   POCT UA Automated manually resulted   Result Value Ref Range    POC Color, Urine Light-Yellow Straw, Yellow, Light-Yellow    POC Appearance, Urine Hazy (A) Clear    POC Glucose, Urine NEGATIVE NEGATIVE mg/dl    POC Bilirubin, Urine NEGATIVE NEGATIVE    POC Ketones, Urine NEGATIVE NEGATIVE mg/dl    POC Specific Gravity, Urine 1.010 1.005 - 1.035    POC Blood, Urine SMALL (1+) (A) NEGATIVE    POC PH, Urine 6.0 No Reference Range Established PH    POC Protein, Urine NEGATIVE NEGATIVE mg/dl    POC Urobilinogen, Urine 0.2 0.2, 1.0 EU/DL    Poc Nitrite, Urine NEGATIVE NEGATIVE    POC Leukocytes, Urine NEGATIVE NEGATIVE      No results found.    Diagnostic study results (if any) were reviewed by DENY Hammer.    Assessment/Plan   Allergies, medications, history, and pertinent labs/EKGs/Imaging reviewed by DENY Hammer.     Medical Decision Making  UA pos for hematuria. On exam, pos R CVA tenderness. Patient appears very uncomfortable, unable to sit still or stand for very long. Pain worsening during exam. Suspecting kidney stone and recommending ED eval. Patient agrees. Signed AMA declining EMS transport. He will self transport to [facility] ED. Patient verbalized understanding and agreed with the plan of care.      At time of discharge, patient was clinically well-appearing and appropriate for outpatient management. The patient/parent/guardian was educated regarding diagnosis, supportive care, OTC and Rx medications. The patient/parent/guardian was given the opportunity to ask questions prior to discharge. They verbalized understanding of discussion of treatment plan, expected course of illness and/or injury, indications on when to return to , when to seek further evaluation in ED/call 911, and the need to follow up with PCP and/or specialist as referred. Patient/parent/guardian was provided with  work/school documentation if requested. Patient stable upon discharge.      Orders and Diagnoses  Diagnoses and all orders for this visit:  Costovertebral angle tenderness  Acute right-sided thoracic back pain  -     POCT UA Automated manually resulted  Hematuria, unspecified type      Medical Admin Record      Patient disposition: ED    Electronically signed by DENY Hammer  6:45 PM

## 2025-03-24 ENCOUNTER — HOSPITAL ENCOUNTER (OUTPATIENT)
Dept: RADIOLOGY | Facility: HOSPITAL | Age: 45
Discharge: HOME | End: 2025-03-24
Payer: COMMERCIAL

## 2025-03-24 ENCOUNTER — OFFICE VISIT (OUTPATIENT)
Dept: PRIMARY CARE | Facility: CLINIC | Age: 45
End: 2025-03-24
Payer: COMMERCIAL

## 2025-03-24 ENCOUNTER — OFFICE VISIT (OUTPATIENT)
Dept: UROLOGY | Facility: CLINIC | Age: 45
End: 2025-03-24
Payer: COMMERCIAL

## 2025-03-24 VITALS
TEMPERATURE: 96.7 F | HEART RATE: 81 BPM | RESPIRATION RATE: 14 BRPM | SYSTOLIC BLOOD PRESSURE: 117 MMHG | DIASTOLIC BLOOD PRESSURE: 76 MMHG | OXYGEN SATURATION: 95 %

## 2025-03-24 VITALS
DIASTOLIC BLOOD PRESSURE: 73 MMHG | SYSTOLIC BLOOD PRESSURE: 123 MMHG | BODY MASS INDEX: 32.76 KG/M2 | HEART RATE: 89 BPM | HEIGHT: 71 IN | WEIGHT: 234 LBS | TEMPERATURE: 98.2 F

## 2025-03-24 DIAGNOSIS — R35.1 NOCTURIA: ICD-10-CM

## 2025-03-24 DIAGNOSIS — R35.0 FREQUENCY OF MICTURITION: ICD-10-CM

## 2025-03-24 DIAGNOSIS — N20.0 KIDNEY STONE ON RIGHT SIDE: ICD-10-CM

## 2025-03-24 DIAGNOSIS — N20.0 KIDNEY STONE ON RIGHT SIDE: Primary | ICD-10-CM

## 2025-03-24 DIAGNOSIS — N20.1 RIGHT URETERAL STONE: ICD-10-CM

## 2025-03-24 DIAGNOSIS — N20.1 URETERAL CALCULUS: Primary | ICD-10-CM

## 2025-03-24 PROCEDURE — 3008F BODY MASS INDEX DOCD: CPT | Performed by: UROLOGY

## 2025-03-24 PROCEDURE — 99214 OFFICE O/P EST MOD 30 MIN: CPT | Performed by: FAMILY MEDICINE

## 2025-03-24 PROCEDURE — 1036F TOBACCO NON-USER: CPT | Performed by: FAMILY MEDICINE

## 2025-03-24 PROCEDURE — 3078F DIAST BP <80 MM HG: CPT | Performed by: FAMILY MEDICINE

## 2025-03-24 PROCEDURE — 99204 OFFICE O/P NEW MOD 45 MIN: CPT | Performed by: UROLOGY

## 2025-03-24 PROCEDURE — 99214 OFFICE O/P EST MOD 30 MIN: CPT | Performed by: UROLOGY

## 2025-03-24 PROCEDURE — 74018 RADEX ABDOMEN 1 VIEW: CPT

## 2025-03-24 PROCEDURE — 3074F SYST BP LT 130 MM HG: CPT | Performed by: FAMILY MEDICINE

## 2025-03-24 PROCEDURE — 3078F DIAST BP <80 MM HG: CPT | Performed by: UROLOGY

## 2025-03-24 PROCEDURE — 3074F SYST BP LT 130 MM HG: CPT | Performed by: UROLOGY

## 2025-03-24 PROCEDURE — 1036F TOBACCO NON-USER: CPT | Performed by: UROLOGY

## 2025-03-24 RX ORDER — TAMSULOSIN HYDROCHLORIDE 0.4 MG/1
0.4 CAPSULE ORAL DAILY
Qty: 21 CAPSULE | Refills: 0 | Status: SHIPPED | OUTPATIENT
Start: 2025-03-24 | End: 2025-04-14

## 2025-03-24 RX ORDER — ONDANSETRON 4 MG/1
4 TABLET, ORALLY DISINTEGRATING ORAL EVERY 6 HOURS PRN
COMMUNITY
Start: 2025-03-21 | End: 2025-03-28

## 2025-03-24 RX ORDER — TAMSULOSIN HYDROCHLORIDE 0.4 MG/1
0.4 CAPSULE ORAL
COMMUNITY
Start: 2025-03-21 | End: 2025-03-24 | Stop reason: SDUPTHER

## 2025-03-24 RX ORDER — KETOROLAC TROMETHAMINE 10 MG/1
10 TABLET, FILM COATED ORAL EVERY 6 HOURS PRN
COMMUNITY
Start: 2025-03-21 | End: 2025-03-24 | Stop reason: SDUPTHER

## 2025-03-24 RX ORDER — OXYCODONE HYDROCHLORIDE 5 MG/1
5 TABLET ORAL EVERY 6 HOURS PRN
COMMUNITY
Start: 2025-03-21 | End: 2025-03-24

## 2025-03-24 RX ORDER — KETOROLAC TROMETHAMINE 10 MG/1
10 TABLET, FILM COATED ORAL EVERY 6 HOURS PRN
Qty: 20 TABLET | Refills: 0 | Status: SHIPPED | OUTPATIENT
Start: 2025-03-24 | End: 2025-03-29

## 2025-03-24 SDOH — ECONOMIC STABILITY: FOOD INSECURITY: WITHIN THE PAST 12 MONTHS, THE FOOD YOU BOUGHT JUST DIDN'T LAST AND YOU DIDN'T HAVE MONEY TO GET MORE.: NEVER TRUE

## 2025-03-24 SDOH — ECONOMIC STABILITY: FOOD INSECURITY: WITHIN THE PAST 12 MONTHS, YOU WORRIED THAT YOUR FOOD WOULD RUN OUT BEFORE YOU GOT MONEY TO BUY MORE.: NEVER TRUE

## 2025-03-24 ASSESSMENT — ENCOUNTER SYMPTOMS
LOSS OF SENSATION IN FEET: 0
OCCASIONAL FEELINGS OF UNSTEADINESS: 0
DEPRESSION: 0

## 2025-03-24 ASSESSMENT — PATIENT HEALTH QUESTIONNAIRE - PHQ9
1. LITTLE INTEREST OR PLEASURE IN DOING THINGS: NOT AT ALL
2. FEELING DOWN, DEPRESSED OR HOPELESS: NOT AT ALL
SUM OF ALL RESPONSES TO PHQ9 QUESTIONS 1 AND 2: 0

## 2025-03-24 ASSESSMENT — LIFESTYLE VARIABLES
HOW OFTEN DO YOU HAVE SIX OR MORE DRINKS ON ONE OCCASION: NEVER
HOW OFTEN DO YOU HAVE A DRINK CONTAINING ALCOHOL: 2-4 TIMES A MONTH
SKIP TO QUESTIONS 9-10: 1
HOW MANY STANDARD DRINKS CONTAINING ALCOHOL DO YOU HAVE ON A TYPICAL DAY: 1 OR 2
AUDIT-C TOTAL SCORE: 2

## 2025-03-24 ASSESSMENT — COLUMBIA-SUICIDE SEVERITY RATING SCALE - C-SSRS
6. HAVE YOU EVER DONE ANYTHING, STARTED TO DO ANYTHING, OR PREPARED TO DO ANYTHING TO END YOUR LIFE?: NO
1. IN THE PAST MONTH, HAVE YOU WISHED YOU WERE DEAD OR WISHED YOU COULD GO TO SLEEP AND NOT WAKE UP?: NO
2. HAVE YOU ACTUALLY HAD ANY THOUGHTS OF KILLING YOURSELF?: NO

## 2025-03-24 ASSESSMENT — PAIN SCALES - GENERAL
PAINLEVEL_OUTOF10: 0-NO PAIN
PAINLEVEL_OUTOF10: 0-NO PAIN

## 2025-03-24 NOTE — PROGRESS NOTES
"Subjective   Patient ID: Pawel Mena \"Mike\" is a 45 y.o. male who presents for RIGHT SIDE KIDNEY (Pt presents for right side kidney, possible kidney stone seen at urgent care then sent to [facility] ED 3-21-24. Pt had CT Scan that showed kidney stone. /Pt requesting referral to  Urologist.).  History of Present Illness  Pawel Mena \"Dario" is a 45 year old male who presents with ongoing pain due to a 5mm kidney stone. He was referred by the ER for follow-up with a urologist.    He has ongoing pain due to a 5mm kidney stone that has not yet passed. The pain is localized to the area where the stone is attempting to pass through the valve to the bladder. He has been using a strainer to monitor for passage of the stone but has not yet passed it.    Pain is being managed with Toradol and Tylenol, which have been effective in preventing severe pain. He was also prescribed Flomax to help facilitate the passage of the stone and has been taking it daily. He was given a three-day supply of oxycodone but has not needed to use it yet. Additionally, he was prescribed Zofran, which he has not used.    No changes in urine color, odor, or pain during urination since his ER visit. He is drinking plenty of fluids to aid in the passage of the stone.    He has a history of kidney surgery at age 16 on both kidneys to remove scar tissue, which he is concerned may have been affected by the current stone. However, his kidney function is currently normal, and there are no signs of infection in his urine.    He is concerned about his upcoming travel plans to [place] on May 1st and is eager to resolve the issue before then.    Reviewed ED and UC notes.     Objective     /76 (BP Location: Left arm, Patient Position: Sitting, BP Cuff Size: Large adult)   Pulse 81   Temp 35.9 °C (96.7 °F) (Temporal)   Resp 14   SpO2 95%          Results  LABS  Urinalysis: No signs of infection    RADIOLOGY  CT scan: 5 mm " nephrolithiasis       Assessment & Plan  Ureteral Calculus  A 5 mm ureteral stone is causing pain as it attempts to pass through the valve to the bladder. The stone has not yet passed, and he continues to experience pain. Stones 6 mm or larger typically require intervention, and at 5 mm, it is at the cusp where intervention may be necessary. There is a risk of infection and potential injury to the ureters. He has a history of kidney surgery at age 16, but current kidney function is normal, and there are no signs of infection. He is concerned about passing the stone before a planned trip on May 1st.  - Refer to urology for urgent evaluation and management.  - Prescribe Toradol refill to manage pain until urology appointment.  - Prescribe Flomax refill to aid in stone passage.  - Monitor for signs of infection or increased pain.  - Recheck potassium levels later in the week or early next week.    Hypokalemia  Low potassium levels, potentially influenced by antihypertensive medications. He takes a multivitamin with potassium. Monitoring is necessary to ensure stability.  - Recheck potassium levels later in the week or early next week.    Niyah Adan MD     This medical note was created with the assistance of artificial intelligence (AI) for documentation purposes. The content has been reviewed and confirmed by the healthcare provider for accuracy and completeness. Patient consented to the use of audio recording and use of AI during their visit.

## 2025-03-24 NOTE — PROGRESS NOTES
"Subjective   Patient ID: Pawel Mena \"Dario" is a 45 y.o. male who presents FOR EVALUALTIN OF A 5 MM PROXIMAL RIGHT URETERAL CALCULUS FOUND ON A CAT SCAN DONE 3-21-15. PT IS NOT HAVING ANY PAIN AT THIS TIME  HPI:  Are you experiencing:  Burning on urination -- NO  Pain on urination  -- NO  Urinary frequency -- OCC  Urinary urgency --  NO  Urge incontinence --NO  Urinary stress incontinence  -- NO  Number of pads used per day --NONE  Enuresis -- NO  Nocturia-- 3 X ON AVG  Hematuria -- NO  Hesitancy -- NO  Post void fullness --  NO  Strength of your stream--NORMAL    ROS:  General-- No C/O fever or chills  Head-- No C/O Dizziness  Eyes-- NO  C/O blurry or double vision  Ears-- No C/O hearing loss  Neck-- Supple  Chest-- No C/O pain or discomfort  Lungs-- No C/O shortness of breath  Abdomen-- No C/O  pain or discomfort, No nausea or vomiting  Back-- No C/O back pain or discomfort  Extremities-- No C/O swelling or pain    OBJECTIVE  General-- well-developed, well-nourished in NAD  Head-- normal cephalic, atraumatic  Eyes-- PERRL, EOM'S FROM, no jaundice  Neck-- Supple, without masses  Chest-- Normal bony structure  Abdomen-- soft, non tender, liver spleen not palpable. No suprapubic masses.  Back-- no flank masses palpable, no CVA tenderness on palpation or percussion  Lymph nodes-- No inguinal lymphadenopathy noted  Testis-- both down, non-tender, without masses  Epididymis-- no masses palpable  Scrotum -- no hydrocele noted  Extremities -- Normal muscle mass and tone for the patients age  Neurological-- oriented times three    3/21/2025  CAT SCAN OF THE ABD AND PELVIS:  IMPRESSION:   Bilateral renal parenchymal thinning.   5 mm proximal RIGHT ureteral stone with mild obstructive uropathy.  There are mild RIGHT perinephric inflammatory changes but no fluid collection.     KUB  5 MM RIGHT MID URETERAL CALCULUS     ASSESSMENT / PLAN  A:  5 MM RIGHT URETERAL CALCULUS CAUSING MOD TO SEVERE COLICKY PAIN  PATHOPHYSIOLOGY " OF THE ABOVE AND OPTIONS OF THERAPY DISCUSSED IN DETAIL  ALL QUESTIONS ANSWERED   P:  SCHEDULE:  CYSTOSCOPY, INSERTION OF A RIGHT URETERAL STENT FOLLOWED BY ESWL OF THE URETERAL CALCULUS, OUT PT, GEN ANES  PT GOING TO [facility] END OF APRIL AND WANTS THE STONE TAKEN CARE OF ASAP  Alonzo Medrano MD 03/24/25 4:34 PM

## 2025-03-24 NOTE — LETTER
"March 25, 2025     Kathy Park DO  [address]  [facility]  [facility] [address]    Patient: Mike Mena   YOB: 1980   Date of Visit: 3/24/2025       Dear Dr. Kathy Park DO:    Thank you for referring Mike Mena to me for evaluation. Below are my notes for this consultation.  If you have questions, please do not hesitate to call me. I look forward to following your patient along with you.       Sincerely,     Alonzo Medrano MD      CC: No Recipients  ______________________________________________________________________________________    Subjective  Patient ID: Pawel Mena \"Dario" is a 45 y.o. male who presents FOR EVALUALTIN OF A 5 MM PROXIMAL RIGHT URETERAL CALCULUS FOUND ON A CAT SCAN DONE 3-21-15. PT IS NOT HAVING ANY PAIN AT THIS TIME  HPI:  Are you experiencing:  Burning on urination -- NO  Pain on urination  -- NO  Urinary frequency -- OCC  Urinary urgency --  NO  Urge incontinence --NO  Urinary stress incontinence  -- NO  Number of pads used per day --NONE  Enuresis -- NO  Nocturia-- 3 X ON AVG  Hematuria -- NO  Hesitancy -- NO  Post void fullness --  NO  Strength of your stream--NORMAL    ROS:  General-- No C/O fever or chills  Head-- No C/O Dizziness  Eyes-- NO  C/O blurry or double vision  Ears-- No C/O hearing loss  Neck-- Supple  Chest-- No C/O pain or discomfort  Lungs-- No C/O shortness of breath  Abdomen-- No C/O  pain or discomfort, No nausea or vomiting  Back-- No C/O back pain or discomfort  Extremities-- No C/O swelling or pain    OBJECTIVE  General-- well-developed, well-nourished in NAD  Head-- normal cephalic, atraumatic  Eyes-- PERRL, EOM'S FROM, no jaundice  Neck-- Supple, without masses  Chest-- Normal bony structure  Abdomen-- soft, non tender, liver spleen not palpable. No suprapubic masses.  Back-- no flank masses palpable, no CVA tenderness on palpation or percussion  Lymph nodes-- No inguinal lymphadenopathy noted  Testis-- both down, " non-tender, without masses  Epididymis-- no masses palpable  Scrotum -- no hydrocele noted  Extremities -- Normal muscle mass and tone for the patients age  Neurological-- oriented times three    3/21/2025  CAT SCAN OF THE ABD AND PELVIS:  IMPRESSION:   Bilateral renal parenchymal thinning.   5 mm proximal RIGHT ureteral stone with mild obstructive uropathy.  There are mild RIGHT perinephric inflammatory changes but no fluid collection.     KUB  5 MM RIGHT MID URETERAL CALCULUS     ASSESSMENT / PLAN  A:  5 MM RIGHT URETERAL CALCULUS CAUSING MOD TO SEVERE COLICKY PAIN  PATHOPHYSIOLOGY OF THE ABOVE AND OPTIONS OF THERAPY DISCUSSED IN DETAIL  ALL QUESTIONS ANSWERED   P:  SCHEDULE:  CYSTOSCOPY, INSERTION OF A RIGHT URETERAL STENT FOLLOWED BY ESWL OF THE URETERAL CALCULUS, OUT PT, GEN ANES  PT GOING TO [facility] END OF APRIL AND WANT THE STONE TAKEN CARE OF ASAP  Alonzo Medrano MD 03/24/25 4:34 PM

## 2025-03-25 ENCOUNTER — HOSPITAL ENCOUNTER (OUTPATIENT)
Facility: HOSPITAL | Age: 45
Setting detail: OUTPATIENT SURGERY
End: 2025-03-25
Attending: UROLOGY | Admitting: UROLOGY
Payer: COMMERCIAL

## 2025-03-25 PROBLEM — N20.1 RIGHT URETERAL STONE: Status: ACTIVE | Noted: 2025-03-24

## 2025-03-26 ENCOUNTER — TELEPHONE (OUTPATIENT)
Dept: PRIMARY CARE | Facility: CLINIC | Age: 45
End: 2025-03-26
Payer: COMMERCIAL

## 2025-03-27 ENCOUNTER — LAB (OUTPATIENT)
Dept: LAB | Facility: HOSPITAL | Age: 45
End: 2025-03-27
Payer: COMMERCIAL

## 2025-03-27 DIAGNOSIS — N20.1 CALCULUS OF URETER: Primary | ICD-10-CM

## 2025-03-27 DIAGNOSIS — E87.6 HYPOKALEMIA: Primary | ICD-10-CM

## 2025-03-27 LAB
ANION GAP SERPL CALC-SCNC: 11 MMOL/L (ref 10–20)
ANION GAP SERPL CALCULATED.4IONS-SCNC: 11 MMOL/L (CALC) (ref 7–17)
BASOPHILS # BLD AUTO: 0.04 X10*3/UL (ref 0–0.1)
BASOPHILS NFR BLD AUTO: 0.4 %
BUN SERPL-MCNC: 23 MG/DL (ref 6–23)
BUN SERPL-MCNC: 23 MG/DL (ref 7–25)
BUN/CREAT SERPL: 16 (CALC) (ref 6–22)
CALCIUM SERPL-MCNC: 8.9 MG/DL (ref 8.6–10.3)
CALCIUM SERPL-MCNC: 9 MG/DL (ref 8.6–10.6)
CHLORIDE SERPL-SCNC: 96 MMOL/L (ref 98–110)
CHLORIDE SERPL-SCNC: 97 MMOL/L (ref 98–107)
CO2 SERPL-SCNC: 30 MMOL/L (ref 20–32)
CO2 SERPL-SCNC: 32 MMOL/L (ref 21–32)
CREAT SERPL-MCNC: 1.39 MG/DL (ref 0.5–1.3)
CREAT SERPL-MCNC: 1.41 MG/DL (ref 0.6–1.29)
EGFRCR SERPLBLD CKD-EPI 2021: 62 ML/MIN/1.73M2
EGFRCR SERPLBLD CKD-EPI 2021: 63 ML/MIN/1.73M*2
EOSINOPHIL # BLD AUTO: 0.09 X10*3/UL (ref 0–0.7)
EOSINOPHIL NFR BLD AUTO: 0.9 %
ERYTHROCYTE [DISTWIDTH] IN BLOOD BY AUTOMATED COUNT: 12.7 % (ref 11.5–14.5)
GLUCOSE SERPL-MCNC: 90 MG/DL (ref 65–99)
GLUCOSE SERPL-MCNC: 90 MG/DL (ref 74–99)
HCT VFR BLD AUTO: 40.7 % (ref 41–52)
HGB BLD-MCNC: 13.6 G/DL (ref 13.5–17.5)
IMM GRANULOCYTES # BLD AUTO: 0.05 X10*3/UL (ref 0–0.7)
IMM GRANULOCYTES NFR BLD AUTO: 0.5 % (ref 0–0.9)
LYMPHOCYTES # BLD AUTO: 1.34 X10*3/UL (ref 1.2–4.8)
LYMPHOCYTES NFR BLD AUTO: 13.4 %
MCH RBC QN AUTO: 29.6 PG (ref 26–34)
MCHC RBC AUTO-ENTMCNC: 33.4 G/DL (ref 32–36)
MCV RBC AUTO: 89 FL (ref 80–100)
MONOCYTES # BLD AUTO: 1.43 X10*3/UL (ref 0.1–1)
MONOCYTES NFR BLD AUTO: 14.3 %
NEUTROPHILS # BLD AUTO: 7.07 X10*3/UL (ref 1.2–7.7)
NEUTROPHILS NFR BLD AUTO: 70.5 %
NRBC BLD-RTO: 0 /100 WBCS (ref 0–0)
PLATELET # BLD AUTO: 223 X10*3/UL (ref 150–450)
POTASSIUM SERPL-SCNC: 3.8 MMOL/L (ref 3.5–5.3)
POTASSIUM SERPL-SCNC: 3.8 MMOL/L (ref 3.5–5.3)
RBC # BLD AUTO: 4.59 X10*6/UL (ref 4.5–5.9)
SODIUM SERPL-SCNC: 136 MMOL/L (ref 136–145)
SODIUM SERPL-SCNC: 137 MMOL/L (ref 135–146)
WBC # BLD AUTO: 10 X10*3/UL (ref 4.4–11.3)

## 2025-03-27 PROCEDURE — 85025 COMPLETE CBC W/AUTO DIFF WBC: CPT

## 2025-03-27 PROCEDURE — 80048 BASIC METABOLIC PNL TOTAL CA: CPT

## 2025-03-27 RX ORDER — BISMUTH SUBSALICYLATE 262 MG
1 TABLET,CHEWABLE ORAL DAILY
COMMUNITY

## 2025-03-27 RX ORDER — ACETAMINOPHEN 500 MG
1000 TABLET ORAL
COMMUNITY

## 2025-03-27 NOTE — PREPROCEDURE INSTRUCTIONS
Current Medications   Medication Instructions    acetaminophen (Tylenol) 500 mg tablet Ok to use as needed    albuterol 90 mcg/actuation inhaler Bring day of surgery    fluticasone propion-salmeteroL (Wixela Inhub) 100-50 mcg/dose diskus inhaler Use am of surgery    hydroCHLOROthiazide (HYDRODiuril) 25 mg tablet Hold day of surgery    ketorolac (Toradol) 10 mg tablet Hold starting 3/30/25    lisinopril 5 mg tablet Hold evening dose 3/31/25    multivitamin tablet Hold until after surgery    omeprazole (PriLOSEC) 40 mg DR capsule Take in am of surgery    tamsulosin (Flomax) 0.4 mg 24 hr capsule Use as prescribed      .    Oxycodone 5mg every 6 hours as needed                      Ok to use if needed      NPO Instructions:    Do not eat any food after midnight the night before your surgery/procedure.  You may have 13 ounces of clear liquids until TWO hours before surgery/procedure. This includes water, black tea/coffee, (no milk or cream) apple juice and electrolyte drinks (Gatorade). No red colors.    Additional Instructions:     The Day before Surgery:  You will be contacted regarding the time of your arrival to facility and surgery time. If you do not get a call by 2:00 pm please call #[phone].    Enter through the main entrance of [facility], located at [address]. Proceed to registration, located on the right hand side of the staircase. You will need your ID and insurance card for registration. Please ensure you have a responsible adult to drive you home. A responsible adult DOES NOT include rideshare service drivers (Uber, Lyft, etc), cab drivers, or public transportation drivers, but “provide a ride” is acceptable.    Take a shower before your procedure. After your shower avoid lotions, powders, deodorants or anything applied to the skin. If you wear contacts or glasses, wear the glasses. If you do not have glasses, please bring a case for your contacts. You may wear hearing aids and  dentures, bring a case for them or we will provide one. Make sure you wear something loose and comfortable. Keep in mind your surgical procedure and wear something that will accommodate incisions or bandages. Please remove all jewelry and piercing's.     For further questions Nader ROSADO can be contacted at [phone] between 7AM-3PM.

## 2025-03-27 NOTE — TELEPHONE ENCOUNTER
Patient had low potassium at his ED visit, would like him to recheck today or tomorrow - order is placed [facility] I see that he saw urology and they have a plan, glad to see that and hope all goes well with his procedure.

## 2025-03-27 NOTE — PREPROCEDURE INSTRUCTIONS
Current Medications   Medication Instructions    acetaminophen (Tylenol) 500 mg tablet Ok to use as needed    albuterol 90 mcg/actuation inhaler Bring day of surgery    fluticasone propion-salmeteroL (Wixela Inhub) 100-50 mcg/dose diskus inhaler Use am of surgery    hydroCHLOROthiazide (HYDRODiuril) 25 mg tablet Hold day of surgery    ketorolac (Toradol) 10 mg tablet Hold starting 3/30/25    lisinopril 5 mg tablet Hold evening dose 3/31/25    multivitamin tablet Hold until after surgery    omeprazole (PriLOSEC) 40 mg DR capsule Take in am of surgery    tamsulosin (Flomax) 0.4 mg 24 hr capsule Use as prescribed      .    Oxycodone 5mg every 6 hours as needed                      Ok to use if needed      NPO Instructions:    Do not eat any food after midnight the night before your surgery/procedure.  You may have 13 ounces of clear liquids until TWO hours before surgery/procedure. This includes water, black tea/coffee, (no milk or cream) apple juice and electrolyte drinks (Gatorade). No red colors.    Additional Instructions:     The Day before Surgery:  You will be contacted regarding the time of your arrival to facility and surgery time. If you do not get a call by 2:00 pm please call #[phone].    Enter through the main entrance of [facility], located at [address]. Proceed to registration, located on the right hand side of the staircase. You will need your ID and insurance card for registration. Please ensure you have a responsible adult to drive you home. A responsible adult DOES NOT include rideshare service drivers (Uber, Lyft, etc), cab drivers, or public transportation drivers, but “provide a ride” is acceptable.    Take a shower before your procedure. After your shower avoid lotions, powders, deodorants or anything applied to the skin. If you wear contacts or glasses, wear the glasses. If you do not have glasses, please bring a case for your contacts. You may wear hearing aids and  dentures, bring a case for them or we will provide one. Make sure you wear something loose and comfortable. Keep in mind your surgical procedure and wear something that will accommodate incisions or bandages. Please remove all jewelry and piercing's.     For further questions Nader ROSADO can be contacted at [phone] between 7AM-3PM.

## 2025-03-31 ENCOUNTER — TELEPHONE (OUTPATIENT)
Dept: PRIMARY CARE | Facility: CLINIC | Age: 45
End: 2025-03-31

## 2025-03-31 ENCOUNTER — PATIENT MESSAGE (OUTPATIENT)
Dept: PRIMARY CARE | Facility: CLINIC | Age: 45
End: 2025-03-31
Payer: COMMERCIAL

## 2025-03-31 NOTE — TELEPHONE ENCOUNTER
That is very hard to predict, as the patient has not had surgery yet. I would recommend he reach out to urology team about this concern.     Kathy Park DO, MSEd  [facility]  Office: [phone]  3/31/2025 1:26 PM

## 2025-03-31 NOTE — TELEPHONE ENCOUNTER
Pt would like to know what your opinion on taking Tylenol and the Toradol together. Pt states that he alternates the two meds. Once one wears off and then he takes the other one. He has been taking them for a week now.

## 2025-03-31 NOTE — TELEPHONE ENCOUNTER
Pt was also informed and  verbalized understanding of previous message. Pt states that he will give the urology office a call.

## 2025-03-31 NOTE — TELEPHONE ENCOUNTER
Patient called in looking for a second opinion about stent in right ureteral. They moved his surgery from tomorrow to the 8th and he will be getting his stent taking out on April 28th. Patient wants to know if that will be enough recovery for when he leave to go to [place] for vacation on April 30th.

## 2025-04-01 VITALS — BODY MASS INDEX: 32.72 KG/M2 | HEIGHT: 71 IN | WEIGHT: 233.69 LBS

## 2025-04-01 DIAGNOSIS — N20.0 KIDNEY STONE ON RIGHT SIDE: ICD-10-CM

## 2025-04-01 RX ORDER — KETOROLAC TROMETHAMINE 10 MG/1
10 TABLET, FILM COATED ORAL EVERY 6 HOURS PRN
Qty: 10 TABLET | Refills: 0 | Status: SHIPPED | OUTPATIENT
Start: 2025-04-01

## 2025-04-01 NOTE — PREPROCEDURE INSTRUCTIONS
Current Medications   Medication Instructions    acetaminophen (Tylenol) 500 mg tablet continue    albuterol 90 mcg/actuation inhaler Take as prescribed    fluticasone propion-salmeteroL (Wixela Inhub) 100-50 mcg/dose diskus inhaler Take as prescribed    hydroCHLOROthiazide (HYDRODiuril) 25 mg tablet Take as prescribed    ketorolac (Toradol) 10 mg tablet Take as prescribed    lisinopril 5 mg tablet Hold day of surgery    multivitamin tablet Stop now    omeprazole (PriLOSEC) 40 mg DR capsule Take as prescribed    tamsulosin (Flomax) 0.4 mg 24 hr capsule Take as prescribed                       NPO Instructions:    Do not eat any food after midnight the night before your surgery/procedure.    Additional Instructions:     Day of Surgery:  You may have clear liquids until TWO hours before surgery/procedure.  This includes water, black tea/coffee, (no milk or cream) apple juice and electrolyte drinks (Gatorade)  Wear  comfortable loose fitting clothing  Do not use moisturizers, creams, lotions or perfume  All jewelry and valuables should be left at home      PRE-OPERATIVE INSTRUCTIONS FOR SURGERY    *Do not eat anything after midnight the night of surgery.  This includes food of any kind (including hard candy, cough drops, mints).     You may have up to 13 ounces of clear liquid  until TWO hours prior to your scheduled surgery time  Clear liquids include water, black tea/coffee, (no milk or cream) apple juice and electrolyte drinks (GATORADE).  You may chew gum until TWO hours prior you your surgery/procedure.     *One of our staff members will call you ONE business day before your surgery, between 11am-2 pm to let you know the time to arrive.  If you have not received a call by 2 pm, call [phone]    *When you arrive at the hospital-->GO TO Registration on the ground floor  *Stop smoking 24 hours prior to surgery.  No Marijuana, CBD Oil or Vaping for 48 hours  *No alcohol 24 hours prior to surgery  *You will need a  responsible adult to drive you home  -No acrylic nails or nail polish on at least one fingernail, NO polish on toes for foot surgery  -You may be asked to remove your dentures, partial plate, eyeglasses or contact lenses before going to surgery.  Please bring a case for these items.  -Body piercings need to be removed.  Jewelry and valuables should be left at home.  -Put on loose,  comfortable, clean clothing, that will accommodate bandages    *If you have any further questions about your pre-op instructions,  not mentioned in this handout, then call [phone]*

## 2025-04-01 NOTE — PREPROCEDURE INSTRUCTIONS
Current Medications   Medication Instructions    acetaminophen (Tylenol) 500 mg tablet continue    albuterol 90 mcg/actuation inhaler Take as prescribed    fluticasone propion-salmeteroL (Wixela Inhub) 100-50 mcg/dose diskus inhaler Take as prescribed    hydroCHLOROthiazide (HYDRODiuril) 25 mg tablet Take as prescribed    ketorolac (Toradol) 10 mg tablet Take as prescribed    lisinopril 5 mg tablet Hold day of surgery    multivitamin tablet Stop now    omeprazole (PriLOSEC) 40 mg DR capsule Take as prescribed    tamsulosin (Flomax) 0.4 mg 24 hr capsule Take as prescribed                       NPO Instructions:    Do not eat any food after midnight the night before your surgery/procedure.    Additional Instructions:     Day of Surgery:  You may have clear liquids until TWO hours before surgery/procedure.  This includes water, black tea/coffee, (no milk or cream) apple juice and electrolyte drinks (Gatorade)  Wear  comfortable loose fitting clothing  Do not use moisturizers, creams, lotions or perfume  All jewelry and valuables should be left at home      PRE-OPERATIVE INSTRUCTIONS FOR SURGERY    *Do not eat anything after midnight the night of surgery.  This includes food of any kind (including hard candy, cough drops, mints).     You may have up to 13 ounces of clear liquid  until TWO hours prior to your scheduled surgery time  Clear liquids include water, black tea/coffee, (no milk or cream) apple juice and electrolyte drinks (GATORADE).  You may chew gum until TWO hours prior you your surgery/procedure.     *One of our staff members will call you ONE business day before your surgery, between 11am-2 pm to let you know the time to arrive.  If you have not received a call by 2 pm, call [phone]    *When you arrive at the hospital-->GO TO Registration on the ground floor  *Stop smoking 24 hours prior to surgery.  No Marijuana, CBD Oil or Vaping for 48 hours  *No alcohol 24 hours prior to surgery  *You will need a  responsible adult to drive you home  -No acrylic nails or nail polish on at least one fingernail, NO polish on toes for foot surgery  -You may be asked to remove your dentures, partial plate, eyeglasses or contact lenses before going to surgery.  Please bring a case for these items.  -Body piercings need to be removed.  Jewelry and valuables should be left at home.  -Put on loose,  comfortable, clean clothing, that will accommodate bandages    *If you have any further questions about your pre-op instructions,  not mentioned in this handout, then call [phone]*

## 2025-04-01 NOTE — PREPROCEDURE INSTRUCTIONS
Current Medications   Medication Instructions    acetaminophen (Tylenol) 500 mg tablet continue    albuterol 90 mcg/actuation inhaler Take as prescribed    fluticasone propion-salmeteroL (Wixela Inhub) 100-50 mcg/dose diskus inhaler Take as prescribed    hydroCHLOROthiazide (HYDRODiuril) 25 mg tablet Take as prescribed    ketorolac (Toradol) 10 mg tablet Take as prescribed    lisinopril 5 mg tablet Hold day of surgery    multivitamin tablet Stop now    omeprazole (PriLOSEC) 40 mg DR capsule Take as prescribed    tamsulosin (Flomax) 0.4 mg 24 hr capsule Take as prescribed                       NPO Instructions:    Do not eat any food after midnight the night before your surgery/procedure.    Additional Instructions:     Day of Surgery:  You may have clear liquids until TWO hours before surgery/procedure.  This includes water, black tea/coffee, (no milk or cream) apple juice and electrolyte drinks (Gatorade)  Wear  comfortable loose fitting clothing  Do not use moisturizers, creams, lotions or perfume  All jewelry and valuables should be left at home      PRE-OPERATIVE INSTRUCTIONS FOR SURGERY    *Do not eat anything after midnight the night of surgery.  This includes food of any kind (including hard candy, cough drops, mints).     You may have up to 13 ounces of clear liquid  until TWO hours prior to your scheduled surgery time  Clear liquids include water, black tea/coffee, (no milk or cream) apple juice and electrolyte drinks (GATORADE).  You may chew gum until TWO hours prior you your surgery/procedure.     *One of our staff members will call you ONE business day before your surgery, between 11am-2 pm to let you know the time to arrive.  If you have not received a call by 2 pm, call [phone]    *When you arrive at the hospital-->GO TO Registration on the ground floor  *Stop smoking 24 hours prior to surgery.  No Marijuana, CBD Oil or Vaping for 48 hours  *No alcohol 24 hours prior to surgery  *You will need a  responsible adult to drive you home  -No acrylic nails or nail polish on at least one fingernail, NO polish on toes for foot surgery  -You may be asked to remove your dentures, partial plate, eyeglasses or contact lenses before going to surgery.  Please bring a case for these items.  -Body piercings need to be removed.  Jewelry and valuables should be left at home.  -Put on loose,  comfortable, clean clothing, that will accommodate bandages    *If you have any further questions about your pre-op instructions,  not mentioned in this handout, then call [phone]*

## 2025-04-01 NOTE — PREPROCEDURE INSTRUCTIONS
Current Medications   Medication Instructions    acetaminophen (Tylenol) 500 mg tablet continue    albuterol 90 mcg/actuation inhaler Take as prescribed    fluticasone propion-salmeteroL (Wixela Inhub) 100-50 mcg/dose diskus inhaler Take as prescribed    hydroCHLOROthiazide (HYDRODiuril) 25 mg tablet Take as prescribed    ketorolac (Toradol) 10 mg tablet Take as prescribed    lisinopril 5 mg tablet Hold day of surgery    multivitamin tablet Stop now    omeprazole (PriLOSEC) 40 mg DR capsule Take as prescribed    tamsulosin (Flomax) 0.4 mg 24 hr capsule Take as prescribed                       NPO Instructions:    Do not eat any food after midnight the night before your surgery/procedure.    Additional Instructions:     Day of Surgery:  You may have clear liquids until TWO hours before surgery/procedure.  This includes water, black tea/coffee, (no milk or cream) apple juice and electrolyte drinks (Gatorade)  Wear  comfortable loose fitting clothing  Do not use moisturizers, creams, lotions or perfume  All jewelry and valuables should be left at home      PRE-OPERATIVE INSTRUCTIONS FOR SURGERY    *Do not eat anything after midnight the night of surgery.  This includes food of any kind (including hard candy, cough drops, mints).     You may have up to 13 ounces of clear liquid  until TWO hours prior to your scheduled surgery time  Clear liquids include water, black tea/coffee, (no milk or cream) apple juice and electrolyte drinks (GATORADE).  You may chew gum until TWO hours prior you your surgery/procedure.     *One of our staff members will call you ONE business day before your surgery, between 11am-2 pm to let you know the time to arrive.  If you have not received a call by 2 pm, call [phone]    *When you arrive at the hospital-->GO TO Registration on the ground floor  *Stop smoking 24 hours prior to surgery.  No Marijuana, CBD Oil or Vaping for 48 hours  *No alcohol 24 hours prior to surgery  *You will need a  responsible adult to drive you home  -No acrylic nails or nail polish on at least one fingernail, NO polish on toes for foot surgery  -You may be asked to remove your dentures, partial plate, eyeglasses or contact lenses before going to surgery.  Please bring a case for these items.  -Body piercings need to be removed.  Jewelry and valuables should be left at home.  -Put on loose,  comfortable, clean clothing, that will accommodate bandages    *If you have any further questions about your pre-op instructions,  not mentioned in this handout, then call [phone]*

## 2025-04-02 DIAGNOSIS — N20.0 KIDNEY STONE: ICD-10-CM

## 2025-04-03 ENCOUNTER — HOSPITAL ENCOUNTER (OUTPATIENT)
Dept: RADIOLOGY | Facility: HOSPITAL | Age: 45
Discharge: HOME | End: 2025-04-03
Payer: COMMERCIAL

## 2025-04-03 DIAGNOSIS — N20.0 KIDNEY STONE: ICD-10-CM

## 2025-04-03 PROCEDURE — 74018 RADEX ABDOMEN 1 VIEW: CPT

## 2025-04-09 ENCOUNTER — OFFICE VISIT (OUTPATIENT)
Dept: UROLOGY | Facility: CLINIC | Age: 45
End: 2025-04-09
Payer: COMMERCIAL

## 2025-04-09 ENCOUNTER — HOSPITAL ENCOUNTER (OUTPATIENT)
Dept: RADIOLOGY | Facility: HOSPITAL | Age: 45
Discharge: HOME | End: 2025-04-09
Payer: COMMERCIAL

## 2025-04-09 VITALS
HEART RATE: 75 BPM | SYSTOLIC BLOOD PRESSURE: 142 MMHG | TEMPERATURE: 98.4 F | WEIGHT: 233 LBS | RESPIRATION RATE: 16 BRPM | BODY MASS INDEX: 32.62 KG/M2 | HEIGHT: 71 IN | DIASTOLIC BLOOD PRESSURE: 86 MMHG

## 2025-04-09 DIAGNOSIS — N20.1 URETERAL CALCULUS: Primary | ICD-10-CM

## 2025-04-09 DIAGNOSIS — N20.0 KIDNEY STONE: ICD-10-CM

## 2025-04-09 PROCEDURE — 74018 RADEX ABDOMEN 1 VIEW: CPT

## 2025-04-09 PROCEDURE — 1036F TOBACCO NON-USER: CPT | Performed by: UROLOGY

## 2025-04-09 PROCEDURE — 3077F SYST BP >= 140 MM HG: CPT | Performed by: UROLOGY

## 2025-04-09 PROCEDURE — 3079F DIAST BP 80-89 MM HG: CPT | Performed by: UROLOGY

## 2025-04-09 PROCEDURE — 99213 OFFICE O/P EST LOW 20 MIN: CPT | Performed by: UROLOGY

## 2025-04-09 PROCEDURE — 74018 RADEX ABDOMEN 1 VIEW: CPT | Performed by: RADIOLOGY

## 2025-04-09 PROCEDURE — 3008F BODY MASS INDEX DOCD: CPT | Performed by: UROLOGY

## 2025-04-09 ASSESSMENT — ENCOUNTER SYMPTOMS: DEPRESSION: 0

## 2025-04-09 ASSESSMENT — PAIN SCALES - GENERAL: PAINLEVEL_OUTOF10: 0-NO PAIN

## 2025-04-09 NOTE — PROGRESS NOTES
"Subjective   Patient ID: Pawel Mena \"Mike\" is a 45 y.o. male who presents for F/U ON HIS DISTAL RIGHT URETERAL CALCULUS .  PT DENIES ANY PAIN OR DISCOMFORT. NO NAUSEA OR VOMITING.    KUB-- DISTAL RIGHT URETERAL CALCULUS HAS MOVED A LITTLE CLOSE TO THE BLADDER  A:  DISTAL RIGHT URETERAL CALCULUS HAS MOVED A LITTLE CLOSE TO THE BLADDER  PATHOPHYSIOLOGY OF THE ABOVE AND OPTIONS OF FURTHER THERAPY DISCUSSED IN DETAIL  ALL QUESTIONS ANSWERED   P:  SCHEDULE:    CYSTOSCOPY, RIGHT URETEROSCOPIC LASER LITHOTRIPSY, OUT PT, GEN ANES  Alonzo Medrano MD 04/09/25 4:35 PM   "

## 2025-04-09 NOTE — LETTER
"April 11, 2025     Kathy Park DO  [address]  [facility]  [facility] [address]    Patient: Mike Mena   YOB: 1980   Date of Visit: 4/9/2025       Dear Dr. Kathy Park DO:    Thank you for referring Mike Mena to me for evaluation. Below are my notes for this consultation.  If you have questions, please do not hesitate to call me. I look forward to following your patient along with you.       Sincerely,     Alonzo Medrano MD      CC: No Recipients  ______________________________________________________________________________________    Subjective  Patient ID: Pawel Mena \"Dario" is a 45 y.o. male who presents for F/U ON HIS DISTAL RIGHT URETERAL CALCULUS .  PT DENIES ANY PAIN OR DISCOMFORT. NO NAUSEA OR VOMITING.    KUB-- DISTAL RIGHT URETERAL CALCULUS HAS MOVED A LITTLE CLOSE TO THE BLADDER  A:  DISTAL RIGHT URETERAL CALCULUS HAS MOVED A LITTLE CLOSE TO THE BLADDER  PATHOPHYSIOLOGY OF THE ABOVE AND OPTIONS OF FURTHER THERAPY DISCUSSED IN DETAIL  ALL QUESTIONS ANSWERED   P:  SCHEDULE:    CYSTOSCOPY, RIGHT URETEROSCOPIC LASER LITHOTRIPSY, OUT PT, GEN ANES  Alonzo Medrano MD 04/09/25 4:35 PM   "

## 2025-04-10 ENCOUNTER — APPOINTMENT (OUTPATIENT)
Dept: PRIMARY CARE | Facility: CLINIC | Age: 45
End: 2025-04-10
Payer: COMMERCIAL

## 2025-04-10 VITALS
DIASTOLIC BLOOD PRESSURE: 68 MMHG | RESPIRATION RATE: 18 BRPM | HEIGHT: 71 IN | OXYGEN SATURATION: 98 % | SYSTOLIC BLOOD PRESSURE: 108 MMHG | WEIGHT: 224.2 LBS | TEMPERATURE: 97.6 F | BODY MASS INDEX: 31.39 KG/M2 | HEART RATE: 107 BPM

## 2025-04-10 DIAGNOSIS — M79.89 LOCALIZED SWELLING OF LOWER EXTREMITY: ICD-10-CM

## 2025-04-10 DIAGNOSIS — F41.9 ANXIETY: ICD-10-CM

## 2025-04-10 DIAGNOSIS — E78.5 HYPERLIPIDEMIA, UNSPECIFIED HYPERLIPIDEMIA TYPE: ICD-10-CM

## 2025-04-10 DIAGNOSIS — I10 PRIMARY HYPERTENSION: ICD-10-CM

## 2025-04-10 DIAGNOSIS — K21.9 GASTROESOPHAGEAL REFLUX DISEASE WITHOUT ESOPHAGITIS: ICD-10-CM

## 2025-04-10 DIAGNOSIS — Z12.11 COLON CANCER SCREENING: ICD-10-CM

## 2025-04-10 DIAGNOSIS — K22.2 SCHATZKI'S RING: Primary | ICD-10-CM

## 2025-04-10 DIAGNOSIS — R31.29 MICROSCOPIC HEMATURIA: ICD-10-CM

## 2025-04-10 PROCEDURE — 3078F DIAST BP <80 MM HG: CPT | Performed by: STUDENT IN AN ORGANIZED HEALTH CARE EDUCATION/TRAINING PROGRAM

## 2025-04-10 PROCEDURE — 3074F SYST BP LT 130 MM HG: CPT | Performed by: STUDENT IN AN ORGANIZED HEALTH CARE EDUCATION/TRAINING PROGRAM

## 2025-04-10 PROCEDURE — 99214 OFFICE O/P EST MOD 30 MIN: CPT | Performed by: STUDENT IN AN ORGANIZED HEALTH CARE EDUCATION/TRAINING PROGRAM

## 2025-04-10 PROCEDURE — 3008F BODY MASS INDEX DOCD: CPT | Performed by: STUDENT IN AN ORGANIZED HEALTH CARE EDUCATION/TRAINING PROGRAM

## 2025-04-10 NOTE — PROGRESS NOTES
FAMILY MEDICINE  OFFICE VISIT   Pawel Mena  [id number]  1980    PCP: Kathy Park DO     Chief Complaint:   Chief Complaint   Patient presents with    Follow-up     4 month fuv- calcium score, look at bp, states he's been having acid reflux-  Pt also has developed kidney stones     SUBJECTIVE     Pawel Mena is a 45 y.o. English-speaking male with pertinent PMHx of kidney stones, who presents to the clinic with complaints of follow-up.    Patient is new to me as PCP, as Dr. Miner left the practice in January.     Kidney Stones   - Saw Dr. Medrano yesterday   - He might go in and grab it out.   - Going to [place] beginning of May   - Currently on hydrochlorothiazide     Calcium Score  - Scheduled for June.     ARIANNE Bentley Ring      Hx of Kidney Disease  - Swelled up when teenager  - Had to reroute artery   - Had it done at [facility]   - [age]yo when occurred   - All of a sudden back and stomach hurt   - Throughout something to do with soccer practice.           HPI      The following portions of the patient's chart were reviewed in this encounter and updated as appropriate:  Tobacco  Allergies  Meds  Problems  Med Hx  Surg Hx  Fam Hx         Home Medication List:  Current Outpatient Medications   Medication Instructions    acetaminophen (TYLENOL) 1,000 mg    albuterol 90 mcg/actuation inhaler 2 puffs, inhalation, Every 4 hours PRN    fluticasone propion-salmeteroL (Wixela Inhub) 100-50 mcg/dose diskus inhaler 1 puff, inhalation, 2 times daily RT    hydroCHLOROthiazide (HYDRODIURIL) 25 mg, oral, Daily    ketorolac (TORADOL) 10 mg, oral, Every 6 hours PRN    lisinopril 5 mg, oral, Daily    multivitamin tablet 1 tablet, Daily    omeprazole (PRILOSEC) 40 mg, oral, Daily before breakfast    tamsulosin (FLOMAX) 0.4 mg, oral, Daily         OBJECTIVE   /68 (BP Location: Left arm, Patient Position: Sitting, BP Cuff Size: Adult)   Pulse 107   Temp 36.4 °C (97.6 °F) (Temporal)   Resp 18  "  Ht 1.803 m (5' 11\")   Wt 102 kg (224 lb 3.2 oz)   SpO2 98%   BMI 31.27 kg/m²   Vital signs and pulse oximetry reviewed.     Physical Exam  Vitals and nursing note reviewed.   Constitutional:       Appearance: Normal appearance.   HENT:      Head: Normocephalic and atraumatic.      Right Ear: Tympanic membrane, ear canal and external ear normal.      Left Ear: Tympanic membrane, ear canal and external ear normal.      Nose: Nose normal. No congestion or rhinorrhea.   Eyes:      General: No scleral icterus.     Conjunctiva/sclera: Conjunctivae normal.   Cardiovascular:      Rate and Rhythm: Normal rate and regular rhythm.      Heart sounds: No murmur heard.  Pulmonary:      Effort: Pulmonary effort is normal. No respiratory distress.      Breath sounds: Normal breath sounds. No wheezing, rhonchi or rales.   Abdominal:      General: Bowel sounds are normal. There is no distension.      Palpations: Abdomen is soft.      Tenderness: There is no abdominal tenderness. There is no guarding.   Musculoskeletal:      Cervical back: No rigidity or tenderness.      Right lower leg: No edema.      Left lower leg: No edema.   Lymphadenopathy:      Cervical: No cervical adenopathy.   Skin:     General: Skin is warm.      Coloration: Skin is not jaundiced.   Neurological:      Mental Status: He is alert. Mental status is at baseline.   Psychiatric:         Mood and Affect: Mood normal.         Behavior: Behavior normal.         ASSESSMENT & PLAN     Problem List Items Addressed This Visit       Anxiety    Overview   Hx of anxiety, not presently on medications  Previously discussed medication vs lifestyle measures, patient disinclined to pursue medication at that time and would like to work on lifestyle measures. He reports he has already scheduled with a counselor at that time as well.     9/14/2022 Neuro-psych testing done with Dr. Bernadette Mazariegos  Recommendations:  1. Sleep study to r/o underlying sleep disorder  2. Outpatient " psychotherapy for regulation of emotions and stress.  3. Practice linking behaviors  4. Have a steady routine  5. Alternating high and low interest tasks.  6. Taking regular breaks.  7. Books, The Smart but Scattered Guide to Success by Tylor and [employer]da & [employer] for People with ADHD by Bull  8. Repeat in 12-24 months to monitor for changes.         Relevant Orders    TSH with reflex to Free T4 if abnormal    Comprehensive Metabolic Panel    Hemoglobin A1C    Lipid Panel    Vitamin B12    Vitamin D 25-Hydroxy,Total (for eval of Vitamin D levels)    Follow Up In [facility] - PCP - Health Maintenance    Esophageal reflux    Overview   Taking Omeprazole 40 mg daily.  Last EGD in 10/2021 (Schatzki's ring + dilation, normal stomach, normal duodenum)         Relevant Orders    Referral to Gastroenterology    Hyperlipidemia    Overview   Lifestyle managed.  FMH of father with vascular event in 60s and lymphedema.  CT cardiac scoring ordered 12/2024 1/2023 TC/HDL ratio 5.5 (, HDL 34)  2/2024 TC/HDL ratio 5.0 (, HDL 33)         Relevant Orders    TSH with reflex to Free T4 if abnormal    Comprehensive Metabolic Panel    Hemoglobin A1C    Lipid Panel    Vitamin B12    Vitamin D 25-Hydroxy,Total (for eval of Vitamin D levels)    Hypertension    Overview   Current regimen:  HCTZ 25 mg (in AM), increased 12/2024 due to fluid retention  Lisinopril 5 mg (in PM)         Relevant Orders    TSH with reflex to Free T4 if abnormal    Comprehensive Metabolic Panel    Hemoglobin A1C    Lipid Panel    Vitamin B12    Vitamin D 25-Hydroxy,Total (for eval of Vitamin D levels)    Follow Up In [facility] - PCP - Health Maintenance    Schatzki's ring - Primary    Overview   Taking Omeprazole 40 mg daily.  Last EGD in 10/2021 (Schatzki's ring + dilation, normal stomach, normal duodenum)         Relevant Orders    Referral to Gastroenterology     Other Visit Diagnoses         Colon cancer  screening        Relevant Orders    Referral to Gastroenterology      Microscopic hematuria        Relevant Orders    TSH with reflex to Free T4 if abnormal    Comprehensive Metabolic Panel    Hemoglobin A1C    Lipid Panel    Vitamin B12    Vitamin D 25-Hydroxy,Total (for eval of Vitamin D levels)      Localized swelling of lower extremity        Relevant Orders    Follow Up In [facility] - PCP - Health Maintenance            PLAN   - No changes today.   - Due for labs.   - Refer to GI for colonoscopy and EGD.     Level 4    Follow-Up Recommendations: 3mo for WELL     Please excuse any typos or grammatical errors, part of this note was constructed with Dragon dictation software.    Kathy Park DO, [place]  [facility]   Office: [phone]  4/10/2025 10:51 AM

## 2025-04-11 ENCOUNTER — PREP FOR PROCEDURE (OUTPATIENT)
Dept: UROLOGY | Facility: CLINIC | Age: 45
End: 2025-04-11
Payer: COMMERCIAL

## 2025-04-11 ENCOUNTER — TELEPHONE (OUTPATIENT)
Dept: PRIMARY CARE | Facility: CLINIC | Age: 45
End: 2025-04-11
Payer: COMMERCIAL

## 2025-04-11 DIAGNOSIS — N20.1 URETERAL CALCULUS: Primary | ICD-10-CM

## 2025-04-11 DIAGNOSIS — K21.00 GASTROESOPHAGEAL REFLUX DISEASE WITH ESOPHAGITIS WITHOUT HEMORRHAGE: ICD-10-CM

## 2025-04-11 RX ORDER — VANCOMYCIN HYDROCHLORIDE 1 G/200ML
1000 INJECTION, SOLUTION INTRAVENOUS ONCE
OUTPATIENT
Start: 2025-04-11 | End: 2025-04-11

## 2025-04-11 NOTE — H&P
"History Of Present Illness  Pawel Mena \"Mike\" is a 45 y.o. male presenting with  A DISTAL RIGHT URETERAL CALCULUS CAUSING INTERMITTENT PAIN..     Past Medical History  He has a past medical history of Asthma, Hypertension, and Kidney stone.    Surgical History  He has a past surgical history that includes Kidney surgery.     Social History  He reports that he has never smoked. He has never used smokeless tobacco. He reports current alcohol use of about 1.0 standard drink of alcohol per week. He reports that he does not use drugs.    Family History  Family History   Problem Relation Name Age of Onset    Peripheral vascular disease Father      Other (lympedema) Father          Allergies  Penicillins    Review of Systems     Physical Exam     Last Recorded Vitals  There were no vitals taken for this visit.    Relevant Results    No results found for this or any previous visit (from the past 24 hours).  Imaging  XR abdomen 1 view    Result Date: 4/10/2025  Nonobstructing bowel gas pattern.   Small calcific density in the right side of the pelvis, which has not significantly changed.     MACRO: None   Signed by: Chayito Zeng 4/10/2025 6:29 PM Dictation workstation:   [id number]     Cardiology, Vascular, and Other Imaging  No other imaging results found for the past 7 days         Assessment/Plan   A:   PT PRESENTS WITH A DISTAL RIGHT URETERAL CALCULUS CAUSING INTERMITTENT PAIN.  P:  CYSTOSCOPY , RIGHT URETEROSCOPIC LASER LITHOTRIPSY, OUT PT, GEN ANES.        I spent 20 minutes in the professional and overall care of this patient.      Alonzo Medrano MD    "

## 2025-04-11 NOTE — TELEPHONE ENCOUNTER
Patient called in said that he forgot to tell you yesterday he has been feeling light headed, nausea and it last for a couple hours in the morning. He wants to know if it could be from the hydrochlorothiazide, or from the kidney stone moving or spasming. He said that he has lost about 20 lbs from this kidney stone.

## 2025-04-11 NOTE — H&P (VIEW-ONLY)
"History Of Present Illness  Pawel Mena \"Mike\" is a 45 y.o. male presenting with  A DISTAL RIGHT URETERAL CALCULUS CAUSING INTERMITTENT PAIN..     Past Medical History  He has a past medical history of Asthma, Hypertension, and Kidney stone.    Surgical History  He has a past surgical history that includes Kidney surgery.     Social History  He reports that he has never smoked. He has never used smokeless tobacco. He reports current alcohol use of about 1.0 standard drink of alcohol per week. He reports that he does not use drugs.    Family History  Family History   Problem Relation Name Age of Onset    Peripheral vascular disease Father      Other (lympedema) Father          Allergies  Penicillins    Review of Systems     Physical Exam     Last Recorded Vitals  There were no vitals taken for this visit.    Relevant Results    No results found for this or any previous visit (from the past 24 hours).  Imaging  XR abdomen 1 view    Result Date: 4/10/2025  Nonobstructing bowel gas pattern.   Small calcific density in the right side of the pelvis, which has not significantly changed.     MACRO: None   Signed by: Chayito Zeng 4/10/2025 6:29 PM Dictation workstation:   [id number]     Cardiology, Vascular, and Other Imaging  No other imaging results found for the past 7 days         Assessment/Plan   A:   PT PRESENTS WITH A DISTAL RIGHT URETERAL CALCULUS CAUSING INTERMITTENT PAIN.  P:  CYSTOSCOPY , RIGHT URETEROSCOPIC LASER LITHOTRIPSY, OUT PT, GEN ANES.        I spent 20 minutes in the professional and overall care of this patient.      Alonzo Medrano MD    "

## 2025-04-14 NOTE — PROGRESS NOTES
Medication Adjustment    The following medication(s) was/were adjusted for Pawel Mena per protocol/policy due to [facility] approved/hospital use guidelines.    Medication(s) adjusted:   Vancomycin 1g IV x 1 dose to 1.5g IV x 1 dose d/t indication of surgical prophylaxis and patient weight between 80-119kg (102kg as of 4/10/25)    Jorge Finn [facility]

## 2025-04-14 NOTE — TELEPHONE ENCOUNTER
Let's see if this calms down after the kidney stone procedure tomorrow. If it does not, I want him to get the labs done this week that I ordered last week and let us know. We may need to get him in to discuss this.     Kathy Park DO, [place]  [facility]  Office: [phone]  4/14/2025 4:07 PM

## 2025-04-14 NOTE — PREPROCEDURE INSTRUCTIONS
Current Medications    Medication Instructions    acetaminophen (Tylenol) 500 mg tablet Take morning of surgery with sip of water    albuterol 90 mcg/actuation inhaler Continue as prescribed     fluticasone propion-salmeteroL (Wixela Inhub) 100-50 mcg/dose diskus inhaler Continue as prescribed     hydroCHLOROthiazide (HYDRODiuril) 25 mg tablet Take morning of surgery with sip of water    lisinopril 5 mg tablet Hold evening dose the night before surgery     multivitamin tablet Stop now     omeprazole (PriLOSEC) 40 mg DR capsule Take morning of surgery with sip of water    tamsulosin (Flomax) 0.4 mg 24 hr capsule Take morning of surgery with sip of water          NPO Instructions:    Do not eat any food after midnight the night before your surgery/procedure.  You may have 13 ounces of clear liquids until TWO hours before your arrival time to the hospital, to be completed by 0945. This includes water, black tea/coffee, (no milk or cream) apple juice and electrolyte drinks (Gatorade).  You may chew gum up to TWO hours before your surgery/procedure.    Additional Instructions:     Day of Surgery: Arrival 1145 and go to registration for a 1315 surgery.     Enter through the main entrance of [facility], located at [address]. Proceed to registration, located on the right hand side of the staircase. You will need your ID and insurance card for registration. Please ensure you have a responsible adult to drive you home. A responsible adult DOES NOT include rideshare service drivers (Uber, Lyft, etc), cab drivers, or public transportation drivers, but “provide a ride” is acceptable.    Take a shower before your procedure. After your shower avoid lotions, powders, deodorants or anything applied to the skin. If you wear contacts or glasses, wear the glasses. If you do not have glasses, please bring a case for your contacts. You may wear hearing aids and dentures, bring a case for them or we will provide one.  Make sure you wear something loose and comfortable. Keep in mind your surgical procedure and wear something that will accommodate incisions or bandages. Please remove all jewelry and piercing's.     For further questions Nader ROSADO can be contacted at [phone] between 7AM-3PM.

## 2025-04-14 NOTE — TELEPHONE ENCOUNTER
Pt called back in regarding this and wanted feedback on what should the next step be from here or any suggestions

## 2025-04-15 ENCOUNTER — ANESTHESIA EVENT (OUTPATIENT)
Dept: OPERATING ROOM | Facility: HOSPITAL | Age: 45
End: 2025-04-15
Payer: COMMERCIAL

## 2025-04-15 ENCOUNTER — HOSPITAL ENCOUNTER (OUTPATIENT)
Facility: HOSPITAL | Age: 45
Setting detail: OUTPATIENT SURGERY
Discharge: HOME | End: 2025-04-15
Attending: UROLOGY | Admitting: UROLOGY
Payer: COMMERCIAL

## 2025-04-15 ENCOUNTER — APPOINTMENT (OUTPATIENT)
Dept: RADIOLOGY | Facility: HOSPITAL | Age: 45
End: 2025-04-15
Payer: COMMERCIAL

## 2025-04-15 ENCOUNTER — HOSPITAL ENCOUNTER (OUTPATIENT)
Dept: CARDIOLOGY | Facility: HOSPITAL | Age: 45
Discharge: HOME | End: 2025-04-15
Payer: COMMERCIAL

## 2025-04-15 ENCOUNTER — PHARMACY VISIT (OUTPATIENT)
Dept: PHARMACY | Facility: CLINIC | Age: 45
End: 2025-04-15
Payer: MEDICARE

## 2025-04-15 ENCOUNTER — ANESTHESIA (OUTPATIENT)
Dept: OPERATING ROOM | Facility: HOSPITAL | Age: 45
End: 2025-04-15
Payer: COMMERCIAL

## 2025-04-15 VITALS
OXYGEN SATURATION: 96 % | HEART RATE: 80 BPM | SYSTOLIC BLOOD PRESSURE: 140 MMHG | TEMPERATURE: 98.6 F | RESPIRATION RATE: 16 BRPM | DIASTOLIC BLOOD PRESSURE: 75 MMHG

## 2025-04-15 DIAGNOSIS — N20.1 URETERAL CALCULUS: ICD-10-CM

## 2025-04-15 DIAGNOSIS — N20.1 RIGHT URETERAL STONE: Primary | ICD-10-CM

## 2025-04-15 DIAGNOSIS — K21.00 GASTROESOPHAGEAL REFLUX DISEASE WITH ESOPHAGITIS WITHOUT HEMORRHAGE: ICD-10-CM

## 2025-04-15 PROCEDURE — 7100000001 HC RECOVERY ROOM TIME - INITIAL BASE CHARGE: Performed by: UROLOGY

## 2025-04-15 PROCEDURE — 2720000007 HC OR 272 NO HCPCS: Performed by: UROLOGY

## 2025-04-15 PROCEDURE — 7100000002 HC RECOVERY ROOM TIME - EACH INCREMENTAL 1 MINUTE: Performed by: UROLOGY

## 2025-04-15 PROCEDURE — A52356 PR CYSTO/URETERO W/LITHOTRIPSY  AND INDWELL STENT INSRT: Performed by: ANESTHESIOLOGIST ASSISTANT

## 2025-04-15 PROCEDURE — 3600000004 HC OR TIME - INITIAL BASE CHARGE - PROCEDURE LEVEL FOUR: Performed by: UROLOGY

## 2025-04-15 PROCEDURE — 7100000010 HC PHASE TWO TIME - EACH INCREMENTAL 1 MINUTE: Performed by: UROLOGY

## 2025-04-15 PROCEDURE — 2500000001 HC RX 250 WO HCPCS SELF ADMINISTERED DRUGS (ALT 637 FOR MEDICARE OP): Performed by: ANESTHESIOLOGY

## 2025-04-15 PROCEDURE — 93005 ELECTROCARDIOGRAM TRACING: CPT

## 2025-04-15 PROCEDURE — 3700000002 HC GENERAL ANESTHESIA TIME - EACH INCREMENTAL 1 MINUTE: Performed by: UROLOGY

## 2025-04-15 PROCEDURE — 3700000001 HC GENERAL ANESTHESIA TIME - INITIAL BASE CHARGE: Performed by: UROLOGY

## 2025-04-15 PROCEDURE — 2500000004 HC RX 250 GENERAL PHARMACY W/ HCPCS (ALT 636 FOR OP/ED): Performed by: ANESTHESIOLOGY

## 2025-04-15 PROCEDURE — 3600000009 HC OR TIME - EACH INCREMENTAL 1 MINUTE - PROCEDURE LEVEL FOUR: Performed by: UROLOGY

## 2025-04-15 PROCEDURE — C1769 GUIDE WIRE: HCPCS | Performed by: UROLOGY

## 2025-04-15 PROCEDURE — 74420 UROGRAPHY RTRGR +-KUB: CPT | Performed by: UROLOGY

## 2025-04-15 PROCEDURE — 2500000005 HC RX 250 GENERAL PHARMACY W/O HCPCS: Performed by: UROLOGY

## 2025-04-15 PROCEDURE — RXMED WILLOW AMBULATORY MEDICATION CHARGE

## 2025-04-15 PROCEDURE — 76000 FLUOROSCOPY <1 HR PHYS/QHP: CPT | Mod: 59

## 2025-04-15 PROCEDURE — 2500000004 HC RX 250 GENERAL PHARMACY W/ HCPCS (ALT 636 FOR OP/ED): Mod: JZ | Performed by: ANESTHESIOLOGY

## 2025-04-15 PROCEDURE — 7100000009 HC PHASE TWO TIME - INITIAL BASE CHARGE: Performed by: UROLOGY

## 2025-04-15 PROCEDURE — 2550000001 HC RX 255 CONTRASTS: Performed by: UROLOGY

## 2025-04-15 PROCEDURE — 2780000003 HC OR 278 NO HCPCS: Performed by: UROLOGY

## 2025-04-15 PROCEDURE — C2625 STENT, NON-COR, TEM W/DEL SY: HCPCS | Performed by: UROLOGY

## 2025-04-15 PROCEDURE — 52344 CYSTO/URETERO STRICTURE TX: CPT | Performed by: UROLOGY

## 2025-04-15 PROCEDURE — 52332 CYSTOSCOPY AND TREATMENT: CPT | Performed by: UROLOGY

## 2025-04-15 PROCEDURE — 2500000004 HC RX 250 GENERAL PHARMACY W/ HCPCS (ALT 636 FOR OP/ED): Performed by: UROLOGY

## 2025-04-15 PROCEDURE — A52356 PR CYSTO/URETERO W/LITHOTRIPSY  AND INDWELL STENT INSRT: Performed by: ANESTHESIOLOGY

## 2025-04-15 PROCEDURE — 2500000004 HC RX 250 GENERAL PHARMACY W/ HCPCS (ALT 636 FOR OP/ED): Performed by: ANESTHESIOLOGIST ASSISTANT

## 2025-04-15 DEVICE — INLAY URETERAL STENT W/HYDROGLIDE GUIDEWIRE
Type: IMPLANTABLE DEVICE | Site: URETER | Status: NON-FUNCTIONAL
Brand: BARD® INLAY® URETERAL STENT WITH HYDROGLIDE™ GUIDEWIRE

## 2025-04-15 RX ORDER — OMEPRAZOLE 40 MG/1
CAPSULE, DELAYED RELEASE ORAL
Qty: 90 CAPSULE | Refills: 3 | OUTPATIENT
Start: 2025-04-15

## 2025-04-15 RX ORDER — ONDANSETRON HYDROCHLORIDE 2 MG/ML
4 INJECTION, SOLUTION INTRAVENOUS ONCE AS NEEDED
Status: DISCONTINUED | OUTPATIENT
Start: 2025-04-15 | End: 2025-04-15 | Stop reason: HOSPADM

## 2025-04-15 RX ORDER — FENTANYL CITRATE 50 UG/ML
INJECTION, SOLUTION INTRAMUSCULAR; INTRAVENOUS AS NEEDED
Status: DISCONTINUED | OUTPATIENT
Start: 2025-04-15 | End: 2025-04-15

## 2025-04-15 RX ORDER — CIPROFLOXACIN 250 MG/1
250 TABLET, FILM COATED ORAL 2 TIMES DAILY
Qty: 14 TABLET | Refills: 0 | Status: SHIPPED | OUTPATIENT
Start: 2025-04-15

## 2025-04-15 RX ORDER — ACETAMINOPHEN 325 MG/1
650 TABLET ORAL EVERY 4 HOURS PRN
Status: DISCONTINUED | OUTPATIENT
Start: 2025-04-15 | End: 2025-04-15 | Stop reason: HOSPADM

## 2025-04-15 RX ORDER — ALBUTEROL SULFATE 0.83 MG/ML
2.5 SOLUTION RESPIRATORY (INHALATION) ONCE AS NEEDED
Status: DISCONTINUED | OUTPATIENT
Start: 2025-04-15 | End: 2025-04-15 | Stop reason: HOSPADM

## 2025-04-15 RX ORDER — SODIUM CHLORIDE 0.9 G/100ML
INJECTION, SOLUTION IRRIGATION AS NEEDED
Status: DISCONTINUED | OUTPATIENT
Start: 2025-04-15 | End: 2025-04-15 | Stop reason: HOSPADM

## 2025-04-15 RX ORDER — LIDOCAINE HCL/PF 100 MG/5ML
SYRINGE (ML) INTRAVENOUS AS NEEDED
Status: DISCONTINUED | OUTPATIENT
Start: 2025-04-15 | End: 2025-04-15

## 2025-04-15 RX ORDER — SODIUM CHLORIDE, SODIUM LACTATE, POTASSIUM CHLORIDE, CALCIUM CHLORIDE 600; 310; 30; 20 MG/100ML; MG/100ML; MG/100ML; MG/100ML
INJECTION, SOLUTION INTRAVENOUS CONTINUOUS PRN
Status: DISCONTINUED | OUTPATIENT
Start: 2025-04-15 | End: 2025-04-15

## 2025-04-15 RX ORDER — DEXAMETHASONE SODIUM PHOSPHATE 10 MG/ML
6 INJECTION INTRAMUSCULAR; INTRAVENOUS ONCE
Status: DISCONTINUED | OUTPATIENT
Start: 2025-04-15 | End: 2025-04-15 | Stop reason: HOSPADM

## 2025-04-15 RX ORDER — LIDOCAINE HYDROCHLORIDE 20 MG/ML
INJECTION, SOLUTION INFILTRATION; PERINEURAL AS NEEDED
Status: DISCONTINUED | OUTPATIENT
Start: 2025-04-15 | End: 2025-04-15

## 2025-04-15 RX ORDER — WATER 1 ML/ML
INJECTION IRRIGATION AS NEEDED
Status: DISCONTINUED | OUTPATIENT
Start: 2025-04-15 | End: 2025-04-15 | Stop reason: HOSPADM

## 2025-04-15 RX ORDER — MEPERIDINE HYDROCHLORIDE 50 MG/ML
12.5 INJECTION INTRAMUSCULAR; INTRAVENOUS; SUBCUTANEOUS EVERY 10 MIN PRN
Status: DISCONTINUED | OUTPATIENT
Start: 2025-04-15 | End: 2025-04-15 | Stop reason: HOSPADM

## 2025-04-15 RX ORDER — MIDAZOLAM HYDROCHLORIDE 1 MG/ML
INJECTION, SOLUTION INTRAMUSCULAR; INTRAVENOUS CONTINUOUS PRN
Status: DISCONTINUED | OUTPATIENT
Start: 2025-04-15 | End: 2025-04-15

## 2025-04-15 RX ORDER — PROPOFOL 10 MG/ML
INJECTION, EMULSION INTRAVENOUS AS NEEDED
Status: DISCONTINUED | OUTPATIENT
Start: 2025-04-15 | End: 2025-04-15

## 2025-04-15 RX ORDER — ONDANSETRON HYDROCHLORIDE 2 MG/ML
INJECTION, SOLUTION INTRAVENOUS AS NEEDED
Status: DISCONTINUED | OUTPATIENT
Start: 2025-04-15 | End: 2025-04-15

## 2025-04-15 RX ADMIN — HYDROMORPHONE HYDROCHLORIDE 0.5 MG: 1 INJECTION, SOLUTION INTRAMUSCULAR; INTRAVENOUS; SUBCUTANEOUS at 14:24

## 2025-04-15 RX ADMIN — ACETAMINOPHEN 650 MG: 325 TABLET, FILM COATED ORAL at 14:49

## 2025-04-15 RX ADMIN — ONDANSETRON 4 MG: 2 INJECTION INTRAMUSCULAR; INTRAVENOUS at 13:45

## 2025-04-15 RX ADMIN — VANCOMYCIN HYDROCHLORIDE 1500 MG: 1.5 INJECTION, POWDER, LYOPHILIZED, FOR SOLUTION INTRAVENOUS at 12:57

## 2025-04-15 RX ADMIN — PROPOFOL 200 MG: 10 INJECTION, EMULSION INTRAVENOUS at 13:02

## 2025-04-15 RX ADMIN — LIDOCAINE HYDROCHLORIDE 60 MG: 20 INJECTION, SOLUTION INFILTRATION; PERINEURAL at 13:02

## 2025-04-15 RX ADMIN — DEXAMETHASONE SODIUM PHOSPHATE 8 MG: 4 INJECTION, SOLUTION INTRAMUSCULAR; INTRAVENOUS at 13:12

## 2025-04-15 RX ADMIN — LIDOCAINE HYDROCHLORIDE 60 MG: 20 INJECTION INTRAVENOUS at 13:02

## 2025-04-15 RX ADMIN — FENTANYL CITRATE 50 MCG: 50 INJECTION, SOLUTION INTRAMUSCULAR; INTRAVENOUS at 13:02

## 2025-04-15 RX ADMIN — FENTANYL CITRATE 50 MCG: 50 INJECTION, SOLUTION INTRAMUSCULAR; INTRAVENOUS at 13:12

## 2025-04-15 RX ADMIN — SODIUM CHLORIDE, POTASSIUM CHLORIDE, SODIUM LACTATE AND CALCIUM CHLORIDE: 600; 310; 30; 20 INJECTION, SOLUTION INTRAVENOUS at 12:57

## 2025-04-15 SDOH — HEALTH STABILITY: MENTAL HEALTH: CURRENT SMOKER: 0

## 2025-04-15 ASSESSMENT — PAIN SCALES - GENERAL
PAINLEVEL_OUTOF10: 0 - NO PAIN
PAINLEVEL_OUTOF10: 7
PAINLEVEL_OUTOF10: 6

## 2025-04-15 ASSESSMENT — PAIN - FUNCTIONAL ASSESSMENT
PAIN_FUNCTIONAL_ASSESSMENT: 0-10
PAIN_FUNCTIONAL_ASSESSMENT: 0-10

## 2025-04-15 ASSESSMENT — PAIN DESCRIPTION - ORIENTATION: ORIENTATION: RIGHT

## 2025-04-15 ASSESSMENT — COLUMBIA-SUICIDE SEVERITY RATING SCALE - C-SSRS
2. HAVE YOU ACTUALLY HAD ANY THOUGHTS OF KILLING YOURSELF?: NO
6. HAVE YOU EVER DONE ANYTHING, STARTED TO DO ANYTHING, OR PREPARED TO DO ANYTHING TO END YOUR LIFE?: NO
1. IN THE PAST MONTH, HAVE YOU WISHED YOU WERE DEAD OR WISHED YOU COULD GO TO SLEEP AND NOT WAKE UP?: NO

## 2025-04-15 ASSESSMENT — PAIN DESCRIPTION - LOCATION: LOCATION: ABDOMEN

## 2025-04-15 NOTE — ANESTHESIA PROCEDURE NOTES
Airway  Date/Time: 4/15/2025 1:03 PM  Urgency: elective    Airway not difficult    Staffing  Performed: CAA   Authorized by: Yuri Chan MD    Performed by: Yuri Chan MD  Patient location during procedure: OR    Indications and Patient Condition  Indications for airway management: anesthesia  Spontaneous Ventilation: absent  Sedation level: deep  Preoxygenated: yes  Patient position: sniffing  MILS maintained throughout  Mask difficulty assessment: 0 - not attempted  Planned trial extubation    Final Airway Details  Final airway type: supraglottic airway      Successful airway: classic  Size 5     Number of attempts at approach: 1  Ventilation between attempts: none  Number of other approaches attempted: 0

## 2025-04-15 NOTE — BRIEF OP NOTE
"Date: 4/15/2025  OR Location: [facility] OR    Name: Pawel Mena \"Mike\", : 1980, Age: 45 y.o., MRN: [id number], Sex: male    Diagnosis  Pre-op Diagnosis      * Ureteral calculus [N20.1] Post-op Diagnosis     * Ureteral calculus [N20.1]     Procedures  RIGHT URETEROSCOPY WITH RETROGRADE PYELOGRAM, C ARM/RIGHT JJ STENT INSERTION  75407 - OH CYSTO W/URETEROSCOPY W/LITHOTRIPSY      Surgeons      * Alonzo Medrano - Primary    Resident/Fellow/Other Assistant:  Surgeons and Role:  * No surgeons found with a matching role *    Staff:   Scrub Person: Lissy  Circulator: Giovanna  Circulator: Lety    Anesthesia Staff: Anesthesiologist: Yuri Chan MD  C-AA: HIRAM Fish    Procedure Summary  Anesthesia: General  ASA: III  Estimated Blood Loss: 0mL  Intra-op Medications:   Administrations occurring from 1310 to 1430 on 04/15/25:   Medication Name Total Dose   sterile water irrigation solution 500 mL   sodium chloride 0.9 % irrigation solution 3,000 mL   iohexol (OMNIPaque) 240 mg iodine/mL solution 10 mL   dexAMETHasone (Decadron) 4 mg/mL 8 mg   fentaNYL (Sublimaze) injection 50 mcg/mL 50 mcg   LR infusion Cannot be calculated   ondansetron (Zofran) 2 mg/mL injection 4 mg              Anesthesia Record               Intraprocedure I/O Totals          Intake    LR infusion 200.00 mL    vancomycin (Vancocin) 1,500 mg in dextrose 5%  mL 500.00 mL    Total Intake 700 mL          Specimen: No specimens collected               Findings: This is a 45-year-old male who has been seen by me in the past with a proximal right ureteral calculus that over time has dropped into the distal right ureter.  The patient is leaving for [place] in approximately 14 days and after much discussion he has elected to go forward with cystoscopy right ureteroscopic laser lithotripsy and removal of the distal right ureteral calculus.  He now comes into the operating room to have this performed.  While in the waiting room a huddle " was performed with the operating room staff patient and his family and all agreed that the patient is going forward with the above-mentioned procedure.  The patient was then taken into the operating room placed on the operating table in the supine position.  A second huddle was performed and all agreed that the patient was to undergo cystoscopy and right ureteroscopic laser lithotripsy.  At this point the patient was placed under general anesthetic.  He was positioned in the dorsolithotomy position.  A third huddle was then performed and again all in the operating room agreed that the patient was going forward with cystoscopy and right ureteroscopic laser lithotripsy.  At this point the patient's perineum and genital area prepped and draped in usual sterile manner.  A #22 Yakut Storz cystoscope was introduced into the urethra under direct vision passed into the bladder.  A small diverticulum was identified on the right posterior wall of the bladder.  No tumors, stones, foreign bodies were identified.  The ureteral orifice ease were in the normal anatomic position and refluxing clear urine bilaterally.  At this point the cystoscope was removed and a rigid ureteroscope was introduced into the urethra and under direct vision passed into the bladder.  Attempts to pass the ureteroscope past the ureterovesical junction were unsuccessful secondary to a stricture in the distal right ureter.  At this point a Glidewire was advanced through the ureteroscope into the ureter and under fluoroscopic control passed into the right renal pelvis.  The ureteroscope was removed and using ureteral dilators the stricture was dilated up to a #14 Y[place]ut with mild difficulty.  This was all done using fluoroscopic control.  The ureteroscope was then advanced over the Glidewire and attempts to pass the ureteroscope up to the stone were unsuccessful after multiple attempts.  There was still significant narrowing in the distal ureter which did  not allow passage of the scope.  At this point a retrograde pyelogram was performed through the ureteroscope to ensure integrity of the ureter.  Dye flowed from the tip of the ureteroscope into the distal ureter and no extravasation was seen.  This was all done using fluoroscopic control.  The ureteroscope was then removed.  A #22 Pakistani Storz cystoscope was then downloaded over the Glidewire.  A 26 cm 6 Pakistani double-J ureteral stent was then advanced over the Glidewire.  When the Glidewire was removed the proximal end of the double-J stent coiled in the right renal pelvis and the distal end coiled in the bladder.  The bladder was then drained.  The patient was placed in the supine position, awakened and transferred to the recovery room in satisfactory condition.    Complications:  None; patient tolerated the procedure well.     Disposition: PACU - hemodynamically stable.  Condition: stable  Specimens Collected: No specimens collected  Attending Attestation: I was present and scrubbed for the entire procedure.    Alonzo Medrano  Phone Number: [phone]

## 2025-04-15 NOTE — ANESTHESIA PREPROCEDURE EVALUATION
"Patient: Pawel Mena \"Mike\"    Procedure Information       Date/Time: 04/15/25 1310    Procedure: RIGHT URETERAL LASER LITHOTRIPSY (Right)    Location: [facility] OR 03 / [facility]    Surgeons: Alonzo Medrano MD            Relevant Problems   Anesthesia (within normal limits)      Cardiac   (+) Hyperlipidemia   (+) Hypertension      Pulmonary   (+) Asthma   (+) CONSTANTINO (obstructive sleep apnea)      Neuro   (+) Anxiety   (+) Meralgia paraesthetica      GI   (+) Esophageal reflux      Musculoskeletal   (+) Chronic bilateral low back pain with right-sided sciatica       Clinical information reviewed:    Allergies  Meds               NPO Detail:  NPO/Void Status  Date of Last Liquid: 04/15/25  Time of Last Liquid: 0630  Date of Last Solid: 04/14/25  Time of Last Solid: 1800  Last Intake Type: Clear fluids         Physical Exam    Airway  Mallampati: III  TM distance: >3 FB  Neck ROM: full     Cardiovascular - normal exam     Dental - normal exam     Pulmonary - normal exam     Abdominal   (+) obese             Anesthesia Plan    History of general anesthesia?: yes  History of complications of general anesthesia?: no    ASA 3     general     The patient is not a current smoker.    intravenous induction   Postoperative administration of opioids is intended.  Trial extubation is planned.  Anesthetic plan and risks discussed with patient.    Plan discussed with CAA.      "

## 2025-04-15 NOTE — ANESTHESIA POSTPROCEDURE EVALUATION
"Patient: Pawel Mena \"Mike\"    Procedure Summary       Date: 04/15/25 Room / Location: [facility] 03 / [facility]    Anesthesia Start: 1257 Anesthesia Stop: 1359    Procedure: RIGHT URETEROSCOPY WITH RETROGRADE PYELOGRAM, C ARM/RIGHT JJ STENT INSERTION (Right) Diagnosis:       Ureteral calculus      (Ureteral calculus [N20.1])    Surgeons: Alonzo Medrano MD Responsible Provider: Yuri Chan MD    Anesthesia Type: general ASA Status: 3            Anesthesia Type: general    Vitals Value Taken Time   /71 04/15/25 1357   Temp 37 04/15/25 1359   Pulse 69 04/15/25 1357   Resp 14 04/15/25 1359   SpO2 97 % 04/15/25 1357   Vitals shown include unfiled device data.    Anesthesia Post Evaluation    Patient location during evaluation: bedside  Patient participation: complete - patient cannot participate  Level of consciousness: lethargic  Pain management: adequate  Airway patency: patent  Cardiovascular status: acceptable  Respiratory status: acceptable  Hydration status: acceptable  Postoperative Nausea and Vomiting: none        No notable events documented.    "

## 2025-04-16 DIAGNOSIS — J45.909 MILD ASTHMA WITHOUT COMPLICATION, UNSPECIFIED WHETHER PERSISTENT (HHS-HCC): ICD-10-CM

## 2025-04-16 DIAGNOSIS — N20.0 KIDNEY STONE ON RIGHT SIDE: ICD-10-CM

## 2025-04-16 DIAGNOSIS — I10 HYPERTENSION, UNSPECIFIED TYPE: ICD-10-CM

## 2025-04-16 DIAGNOSIS — K21.00 GASTROESOPHAGEAL REFLUX DISEASE WITH ESOPHAGITIS WITHOUT HEMORRHAGE: ICD-10-CM

## 2025-04-16 DIAGNOSIS — J45.20 MILD INTERMITTENT ASTHMA WITHOUT COMPLICATION (HHS-HCC): ICD-10-CM

## 2025-04-16 LAB
ATRIAL RATE: 76 BPM
P AXIS: -5 DEGREES
P OFFSET: 193 MS
P ONSET: 148 MS
PR INTERVAL: 140 MS
Q ONSET: 218 MS
QRS COUNT: 12 BEATS
QRS DURATION: 92 MS
QT INTERVAL: 416 MS
QTC CALCULATION(BAZETT): 468 MS
QTC FREDERICIA: 449 MS
R AXIS: 49 DEGREES
T AXIS: -6 DEGREES
T OFFSET: 426 MS
VENTRICULAR RATE: 76 BPM

## 2025-04-16 PROCEDURE — 93005 ELECTROCARDIOGRAM TRACING: CPT

## 2025-04-16 RX ORDER — HYDROCHLOROTHIAZIDE 25 MG/1
25 TABLET ORAL DAILY
Qty: 90 TABLET | Refills: 3 | Status: SHIPPED | OUTPATIENT
Start: 2025-04-16 | End: 2026-04-11

## 2025-04-16 RX ORDER — LISINOPRIL 5 MG/1
5 TABLET ORAL DAILY
Qty: 90 TABLET | Refills: 3 | Status: SHIPPED | OUTPATIENT
Start: 2025-04-16 | End: 2026-04-16

## 2025-04-16 RX ORDER — FLUTICASONE PROPIONATE AND SALMETEROL 100; 50 UG/1; UG/1
1 POWDER RESPIRATORY (INHALATION)
Qty: 60 EACH | Refills: 0 | Status: SHIPPED | OUTPATIENT
Start: 2025-04-16

## 2025-04-16 RX ORDER — OMEPRAZOLE 40 MG/1
40 CAPSULE, DELAYED RELEASE ORAL
Qty: 90 CAPSULE | Refills: 3 | Status: SHIPPED | OUTPATIENT
Start: 2025-04-16

## 2025-04-16 RX ORDER — ALBUTEROL SULFATE 90 UG/1
2 INHALANT RESPIRATORY (INHALATION) EVERY 4 HOURS PRN
Qty: 18 G | Refills: 2 | Status: SHIPPED | OUTPATIENT
Start: 2025-04-16

## 2025-04-16 ASSESSMENT — ENCOUNTER SYMPTOMS
MUSCULOSKELETAL NEGATIVE: 1
EYES NEGATIVE: 1
ENDOCRINE NEGATIVE: 1
PSYCHIATRIC NEGATIVE: 1
NEUROLOGICAL NEGATIVE: 1
GASTROINTESTINAL NEGATIVE: 1
HEMATOLOGIC/LYMPHATIC NEGATIVE: 1
RESPIRATORY NEGATIVE: 1
CARDIOVASCULAR NEGATIVE: 1

## 2025-04-16 ASSESSMENT — PAIN SCALES - GENERAL: PAINLEVEL_OUTOF10: 0 - NO PAIN

## 2025-04-16 NOTE — H&P
"History Of Present Illness  Pawel Mena \"Mike\" is a 45 y.o. male presenting with  ARIGHT URETERAL CALCULUS CAUSING INTERMITTENT COLICKY PAIN .     Past Medical History  Medical History[1]    Surgical History  Surgical History[2]     Social History  He reports that he has never smoked. He has never used smokeless tobacco. He reports current alcohol use of about 1.0 standard drink of alcohol per week. He reports that he does not use drugs.    Family History  Family History[3]     Allergies  Penicillins    Review of Systems   HENT: Negative.     Eyes: Negative.    Respiratory: Negative.     Cardiovascular: Negative.    Gastrointestinal: Negative.    Endocrine: Negative.    Genitourinary: Negative.    Musculoskeletal: Negative.    Skin: Negative.    Neurological: Negative.    Hematological: Negative.    Psychiatric/Behavioral: Negative.          Physical Exam  Constitutional:       Appearance: Normal appearance.   HENT:      Head: Normocephalic and atraumatic.   Eyes:      Pupils: Pupils are equal, round, and reactive to light.   Cardiovascular:      Rate and Rhythm: Normal rate.   Pulmonary:      Effort: Pulmonary effort is normal.   Abdominal:      General: Abdomen is flat. Bowel sounds are normal.      Palpations: Abdomen is soft.   Genitourinary:     Penis: Normal.       Testes: Normal.   Musculoskeletal:         General: Normal range of motion.      Cervical back: Normal range of motion.   Skin:     General: Skin is warm.   Neurological:      General: No focal deficit present.      Mental Status: He is alert and oriented to person, place, and time.          Last Recorded Vitals  Blood pressure 140/75, pulse 80, temperature 37 °C (98.6 °F), temperature source Temporal, resp. rate 16, SpO2 96%.    Relevant Results             Assessment & Plan  Ureteral calculus  A:  PT HAS A DISTAL RIGHT URETERAL CALCULUS CAUSING INTERMITTENT PAIN  P:  CYSTO, RIGHT URETEROSCOPIC LASER LITHOTRIPSY, OUT PT, GEN ANES    I spent 20 " minutes in the professional and overall care of this patient.      Alonzo Medrano MD  4-15-25       [1]   Past Medical History:  Diagnosis Date    Asthma     Hypertension     Kidney stone    [2]   Past Surgical History:  Procedure Laterality Date    KIDNEY SURGERY      PUV surgery   [3]   Family History  Problem Relation Name Age of Onset    Peripheral vascular disease Father      Other (lympedema) Father

## 2025-04-16 NOTE — TELEPHONE ENCOUNTER
1. Gastroesophageal reflux disease with esophagitis without hemorrhage  - omeprazole (PriLOSEC) 40 mg DR capsule; Take 1 capsule (40 mg) by mouth once daily in the morning. Take before meals.  Dispense: 90 capsule; Refill: 3      Kathy Park DO, MSEd  [facility]  Office: [phone]  4/16/2025 9:02 AM

## 2025-04-17 RX ORDER — TAMSULOSIN HYDROCHLORIDE 0.4 MG/1
0.4 CAPSULE ORAL DAILY
Qty: 30 CAPSULE | Refills: 1 | Status: SHIPPED | OUTPATIENT
Start: 2025-04-17 | End: 2025-06-16

## 2025-05-11 NOTE — PROGRESS NOTES
"Subjective   Patient ID: Pawel Mena \"Mike\" is a 45 y.o. male who presents for A F/U AFTER HAVING A RIGHT URETERAL STENT PLACED ON 4-15-25 FOR A DISTAL RIGHT URETERAL CALCULUS THAT I COULD NOT ACCESS WITH A URETEROSCOPE BECAUSE OF NARROWING OF THE DISTAL URETER.  NO FEFVR  OR TEETH CHATTERING CHILLS.   HPI:  Are you experiencing:  Burning on urination --OCC  Pain on urination  -- NO  Urinary frequency -- NO  Urinary urgency -- NO  Urge incontinence --NO  Urinary stress incontinence  -- NO  Number of pads used per day --NONE  Enuresis -- NO  Nocturia-- 2 X ON AVG  Hematuria --NO    ASSESSMENT / PLAN  A:  PT NOW PRESENTS FOR A F/ U AFTER HAVING A  RIGHT URETERAL STENT PLACED ON 4-15-25 FOR A DISTAL RIGHT URETERAL CALCULUS THAT I COULD NOT ACCESS WITH A URETEROSCOPE BECAUSE OF NARROWING OF THE DISTAL URETER.   P:  SCHEDULE:  CYSTO, REMOVAL OF THE URETERAL STENT, RIGHT URETEROSCOPIC LASER LITHOTRIPSY , OUT PT, GEN ANES.   Alonzo Medrano MD 05/11/25 8:37 AM   "

## 2025-05-12 ENCOUNTER — APPOINTMENT (OUTPATIENT)
Age: 45
End: 2025-05-12
Payer: COMMERCIAL

## 2025-05-12 VITALS — DIASTOLIC BLOOD PRESSURE: 64 MMHG | HEART RATE: 80 BPM | SYSTOLIC BLOOD PRESSURE: 111 MMHG

## 2025-05-12 DIAGNOSIS — N20.1 URETERAL CALCULUS, RIGHT: Primary | ICD-10-CM

## 2025-05-12 DIAGNOSIS — R35.1 NOCTURIA: ICD-10-CM

## 2025-05-12 PROCEDURE — 99024 POSTOP FOLLOW-UP VISIT: CPT | Performed by: UROLOGY

## 2025-05-12 PROCEDURE — 3078F DIAST BP <80 MM HG: CPT | Performed by: UROLOGY

## 2025-05-12 PROCEDURE — 3074F SYST BP LT 130 MM HG: CPT | Performed by: UROLOGY

## 2025-05-12 NOTE — LETTER
"May 13, 2025     Kathy Park DO  [address]  [facility]  [facility] [address]    Patient: Mike Mena   YOB: 1980   Date of Visit: 5/12/2025       Dear Dr. Kathy Park DO:    Thank you for referring Mike Mena to me for evaluation. Below are my notes for this consultation.  If you have questions, please do not hesitate to call me. I look forward to following your patient along with you.       Sincerely,     Alonzo Medrano MD      CC: No Recipients  ______________________________________________________________________________________    Subjective  Patient ID: Pawel Mena \"Dario" is a 45 y.o. male who presents for A F/U AFTER HAVING A RIGHT URETERAL STENT PLACED ON 4-15-25 FOR A DISTAL RIGHT URETERAL CALCULUS THAT I COULD NOT ACCESS WITH A URETEROSCOPE BECAUSE OF NARROWING OF THE DISTAL URETER.  NO FEFVR  OR TEETH CHATTERING CHILLS.   HPI:  Are you experiencing:  Burning on urination --OCC  Pain on urination  -- NO  Urinary frequency -- NO  Urinary urgency -- NO  Urge incontinence --NO  Urinary stress incontinence  -- NO  Number of pads used per day --NONE  Enuresis -- NO  Nocturia-- 2 X ON AVG  Hematuria --NO    ASSESSMENT / PLAN  A:  PT NOW PRESENTS FOR A F/ U AFTER HAVING A  RIGHT URETERAL STENT PLACED ON 4-15-25 FOR A DISTAL RIGHT URETERAL CALCULUS THAT I COULD NOT ACCESS WITH A URETEROSCOPE BECAUSE OF NARROWING OF THE DISTAL URETER.   P:  SCHEDULE:  CYSTO, REMOVAL OF THE URETERAL STENT, RIGHT URETEROSCOPIC LASER LITHOTRIPSY , OUT PT, GEN ANES.   Alonzo Medrano MD 05/11/25 8:37 AM   "

## 2025-05-12 NOTE — LETTER
"May 13, 2025     Kathy Park DO  [address]  [facility]  [facility] [address]    Patient: Mike Mena   YOB: 1980   Date of Visit: 5/12/2025       Dear Dr. Kathy Park DO:    Thank you for referring Mike Mena to me for evaluation. Below are my notes for this consultation.  If you have questions, please do not hesitate to call me. I look forward to following your patient along with you.       Sincerely,     Alonzo Medrano MD      CC: No Recipients  ______________________________________________________________________________________    Subjective  Patient ID: Pawel Mena \"Dario" is a 45 y.o. male who presents for A F/U AFTER HAVING A RIGHT URETERAL STENT PLACED ON 4-15-25 FOR A DISTAL RIGHT URETERAL CALCULUS THAT I COULD NOT ACCESS WITH A URETEROSCOPE BECAUSE OF NARROWING OF THE DISTAL URETER.  NO FEFVR  OR TEETH CHATTERING CHILLS.   HPI:  Are you experiencing:  Burning on urination --OCC  Pain on urination  -- NO  Urinary frequency -- NO  Urinary urgency -- NO  Urge incontinence --NO  Urinary stress incontinence  -- NO  Number of pads used per day --NONE  Enuresis -- NO  Nocturia-- 2 X ON AVG  Hematuria --NO    ASSESSMENT / PLAN  A:  PT NOW PRESENTS FOR A F/ U AFTER HAVING A  RIGHT URETERAL STENT PLACED ON 4-15-25 FOR A DISTAL RIGHT URETERAL CALCULUS THAT I COULD NOT ACCESS WITH A URETEROSCOPE BECAUSE OF NARROWING OF THE DISTAL URETER.   P:  SCHEDULE:  CYSTO, REMOVAL OF THE URETERAL STENT, RIGHT URETEROSCOPIC LASER LITHOTRIPSY , OUT PT, GEN ANES.   Alonzo Medrano MD 05/11/25 8:37 AM   "

## 2025-05-14 ENCOUNTER — APPOINTMENT (OUTPATIENT)
Age: 45
End: 2025-05-14
Payer: COMMERCIAL

## 2025-05-17 ENCOUNTER — PREP FOR PROCEDURE (OUTPATIENT)
Dept: UROLOGY | Facility: CLINIC | Age: 45
End: 2025-05-17
Payer: COMMERCIAL

## 2025-05-17 DIAGNOSIS — N20.1 URETERAL CALCULUS: Primary | ICD-10-CM

## 2025-05-17 RX ORDER — CIPROFLOXACIN 2 MG/ML
400 INJECTION, SOLUTION INTRAVENOUS ONCE
OUTPATIENT
Start: 2025-05-17 | End: 2025-05-17

## 2025-05-17 NOTE — H&P
"History Of Present Illness  Pawel Mena \"Mike\" is a 45 y.o. male presenting with  A DISTAL RIGHT URETERAL CALCULUS..     Past Medical History  He has a past medical history of Asthma, Hypertension, and Kidney stone.    Surgical History  He has a past surgical history that includes Kidney surgery.     Social History  He reports that he has never smoked. He has never used smokeless tobacco. He reports current alcohol use of about 1.0 standard drink of alcohol per week. He reports that he does not use drugs.    Family History  Family History[1]     Allergies  Penicillins    Review of Systems     Physical Exam     Last Recorded Vitals  There were no vitals taken for this visit.    Relevant Results    No results found for this or any previous visit (from the past 24 hours).  Imaging  No results found.    Cardiology, Vascular, and Other Imaging  No other imaging results found for the past 7 days         Assessment/Plan   A:  PT PRESENTS WITH A DISTAL RIGHT URETERAL CALCULUS AND AN INDWELLING URETERAL STENT.  P:  SCHEDULE:  CYSTO, REMOVAL OF THE URETERAL STENT FOLLOWED BY RIGHT URETEROSCOPIC LASER LITHOTRIPSY, OUT PT, GEN ANES.     I spent 20 minutes in the professional and overall care of this patient.    Alonzo Medrano MD         [1]   Family History  Problem Relation Name Age of Onset    Peripheral vascular disease Father      Other (lympedema) Father       "

## 2025-05-30 ENCOUNTER — PRE-ADMISSION TESTING (OUTPATIENT)
Dept: PREADMISSION TESTING | Facility: HOSPITAL | Age: 45
End: 2025-05-30
Payer: COMMERCIAL

## 2025-05-30 VITALS
SYSTOLIC BLOOD PRESSURE: 141 MMHG | BODY MASS INDEX: 31.17 KG/M2 | OXYGEN SATURATION: 98 % | DIASTOLIC BLOOD PRESSURE: 77 MMHG | HEART RATE: 84 BPM | TEMPERATURE: 95 F | WEIGHT: 222.66 LBS | HEIGHT: 71 IN | RESPIRATION RATE: 18 BRPM

## 2025-05-30 DIAGNOSIS — K22.2 SCHATZKI'S RING: ICD-10-CM

## 2025-05-30 DIAGNOSIS — I10 PRIMARY HYPERTENSION: Primary | ICD-10-CM

## 2025-05-30 DIAGNOSIS — J45.20 MILD INTERMITTENT ASTHMA WITHOUT COMPLICATION (HHS-HCC): ICD-10-CM

## 2025-05-30 DIAGNOSIS — N20.1 URETERAL CALCULUS: ICD-10-CM

## 2025-05-30 LAB
ANION GAP SERPL CALC-SCNC: 10 MMOL/L (ref 10–20)
APPEARANCE UR: CLEAR
BASOPHILS # BLD AUTO: 0.07 X10*3/UL (ref 0–0.1)
BASOPHILS NFR BLD AUTO: 1 %
BILIRUB UR STRIP.AUTO-MCNC: NEGATIVE MG/DL
BUN SERPL-MCNC: 11 MG/DL (ref 6–23)
CALCIUM SERPL-MCNC: 9.2 MG/DL (ref 8.6–10.3)
CHLORIDE SERPL-SCNC: 98 MMOL/L (ref 98–107)
CO2 SERPL-SCNC: 31 MMOL/L (ref 21–32)
COLOR UR: ABNORMAL
CREAT SERPL-MCNC: 0.81 MG/DL (ref 0.5–1.3)
EGFRCR SERPLBLD CKD-EPI 2021: >90 ML/MIN/1.73M*2
EOSINOPHIL # BLD AUTO: 0.09 X10*3/UL (ref 0–0.7)
EOSINOPHIL NFR BLD AUTO: 1.3 %
ERYTHROCYTE [DISTWIDTH] IN BLOOD BY AUTOMATED COUNT: 12.7 % (ref 11.5–14.5)
GLUCOSE SERPL-MCNC: 111 MG/DL (ref 74–99)
GLUCOSE UR STRIP.AUTO-MCNC: NORMAL MG/DL
HCT VFR BLD AUTO: 43.2 % (ref 41–52)
HGB BLD-MCNC: 14.5 G/DL (ref 13.5–17.5)
IMM GRANULOCYTES # BLD AUTO: 0.03 X10*3/UL (ref 0–0.7)
IMM GRANULOCYTES NFR BLD AUTO: 0.4 % (ref 0–0.9)
KETONES UR STRIP.AUTO-MCNC: NEGATIVE MG/DL
LEUKOCYTE ESTERASE UR QL STRIP.AUTO: ABNORMAL
LYMPHOCYTES # BLD AUTO: 2.07 X10*3/UL (ref 1.2–4.8)
LYMPHOCYTES NFR BLD AUTO: 29.3 %
MCH RBC QN AUTO: 29.2 PG (ref 26–34)
MCHC RBC AUTO-ENTMCNC: 33.6 G/DL (ref 32–36)
MCV RBC AUTO: 87 FL (ref 80–100)
MONOCYTES # BLD AUTO: 0.63 X10*3/UL (ref 0.1–1)
MONOCYTES NFR BLD AUTO: 8.9 %
MUCOUS THREADS #/AREA URNS AUTO: NORMAL /LPF
NEUTROPHILS # BLD AUTO: 4.18 X10*3/UL (ref 1.2–7.7)
NEUTROPHILS NFR BLD AUTO: 59.1 %
NITRITE UR QL STRIP.AUTO: NEGATIVE
NRBC BLD-RTO: 0 /100 WBCS (ref 0–0)
PH UR STRIP.AUTO: 5 [PH]
PLATELET # BLD AUTO: 233 X10*3/UL (ref 150–450)
POTASSIUM SERPL-SCNC: 3.7 MMOL/L (ref 3.5–5.3)
PROT UR STRIP.AUTO-MCNC: NEGATIVE MG/DL
RBC # BLD AUTO: 4.97 X10*6/UL (ref 4.5–5.9)
RBC # UR STRIP.AUTO: NEGATIVE MG/DL
RBC #/AREA URNS AUTO: NORMAL /HPF
SODIUM SERPL-SCNC: 135 MMOL/L (ref 136–145)
SP GR UR STRIP.AUTO: 1.01
UROBILINOGEN UR STRIP.AUTO-MCNC: NORMAL MG/DL
WBC # BLD AUTO: 7.1 X10*3/UL (ref 4.4–11.3)
WBC #/AREA URNS AUTO: NORMAL /HPF

## 2025-05-30 PROCEDURE — 85025 COMPLETE CBC W/AUTO DIFF WBC: CPT | Performed by: UROLOGY

## 2025-05-30 PROCEDURE — 80048 BASIC METABOLIC PNL TOTAL CA: CPT | Performed by: UROLOGY

## 2025-05-30 PROCEDURE — 87086 URINE CULTURE/COLONY COUNT: CPT | Mod: PARLAB

## 2025-05-30 PROCEDURE — 99204 OFFICE O/P NEW MOD 45 MIN: CPT | Performed by: NURSE PRACTITIONER

## 2025-05-30 PROCEDURE — 81001 URINALYSIS AUTO W/SCOPE: CPT

## 2025-05-30 ASSESSMENT — DUKE ACTIVITY SCORE INDEX (DASI)
CAN YOU CLIMB A FLIGHT OF STAIRS OR WALK UP A HILL: YES
CAN YOU DO LIGHT WORK AROUND THE HOUSE LIKE DUSTING OR WASHING DISHES: YES
DASI METS SCORE: 9.9
CAN YOU HAVE SEXUAL RELATIONS: YES
CAN YOU DO HEAVY WORK AROUND THE HOUSE LIKE SCRUBBING FLOORS OR LIFTING AND MOVING HEAVY FURNITURE: YES
CAN YOU PARTICIPATE IN STRENOUS SPORTS LIKE SWIMMING, SINGLES TENNIS, FOOTBALL, BASKETBALL, OR SKIING: YES
CAN YOU DO YARD WORK LIKE RAKING LEAVES, WEEDING OR PUSHING A MOWER: YES
TOTAL_SCORE: 58.2
CAN YOU PARTICIPATE IN MODERATE RECREATIONAL ACTIVITIES LIKE GOLF, BOWLING, DANCING, DOUBLES TENNIS OR THROWING A BASEBALL OR FOOTBALL: YES
CAN YOU WALK A BLOCK OR TWO ON LEVEL GROUND: YES
CAN YOU RUN A SHORT DISTANCE: YES
CAN YOU TAKE CARE OF YOURSELF (EAT, DRESS, BATHE, OR USE TOILET): YES
CAN YOU DO MODERATE WORK AROUND THE HOUSE LIKE VACUUMING, SWEEPING FLOORS OR CARRYING GROCERIES: YES
CAN YOU WALK INDOORS, SUCH AS AROUND YOUR HOUSE: YES

## 2025-05-30 ASSESSMENT — PAIN - FUNCTIONAL ASSESSMENT: PAIN_FUNCTIONAL_ASSESSMENT: 0-10

## 2025-05-30 ASSESSMENT — PAIN SCALES - GENERAL: PAINLEVEL_OUTOF10: 0 - NO PAIN

## 2025-05-30 NOTE — H&P (VIEW-ONLY)
"CPM/PAT Evaluation       Name: Pawel Mena (Pawel Mena \"Mike\")  /Age: 1980/45 y.o.     In-Person       Chief Complaint: PAT for planned surgery    45 yr old male w/PHx of asthma, HTN, HLD, GERD, and ureteral calculus referred to PAT for planned Right ureteroscopic laser lithotripsy w/Dr Medrano on 6/10/2025      Patient reports feeling overall well, denies fever, cough or recent infection. Denies hx of stroke, seizure or blood clot.  Reports remaining otherwise physically active routinely walking/hiking and playing with family; denies cardiac or respiratory symptoms. Past surgical hx includes kidney procedure () and Right ureteroscopy w/tretrograde pyelogram/JJ stent (4/15/2025); denies past issues with anesthesia.      Followed by PCP (Kathy Park DO) 4/10/2025        Medical History[1]    Surgical History[2]    Patient  reports being sexually active and has had partner(s) who are female. He reports using the following method of birth control/protection: None.    Family History[3]    Allergies[4]    Prior to Admission medications    Medication Sig Start Date End Date Taking? Authorizing Provider   acetaminophen (Tylenol) 500 mg tablet Take 2 tablets (1,000 mg) by mouth.    Historical Provider, MD   albuterol 90 mcg/actuation inhaler Inhale 2 puffs every 4 hours if needed for wheezing or shortness of breath. 25   Jose Bennett MD   ciprofloxacin (Cipro) 250 mg tablet Take 1 tablet (250 mg) by mouth 2 times a day.  Patient not taking: Reported on 2025 4/15/25   Alonzo Medrano MD   fluticasone propion-salmeteroL (Wixela Inhub) 100-50 mcg/dose diskus inhaler Inhale 1 puff 2 times a day. 25   Jose Bennett MD   hydroCHLOROthiazide (HYDRODiuril) 25 mg tablet Take 1 tablet (25 mg) by mouth once daily. 25  Jose Bennett MD   hydroCHLOROthiazide (HYDRODiuril) 25 mg tablet Take 1 tablet (25 mg) by mouth once daily. 25  Niyah Adan MD   ketorolac " "(Toradol) 10 mg tablet Take 1 tablet (10 mg) by mouth every 6 hours if needed for moderate pain (4 - 6). 4/1/25   Alonzo Medrano MD   lisinopril 5 mg tablet Take 1 tablet (5 mg) by mouth once daily. 4/16/25 4/16/26  Jose Bennett MD   multivitamin tablet Take 1 tablet by mouth once daily.    Historical Provider, MD   omeprazole (PriLOSEC) 40 mg DR capsule Take 1 capsule (40 mg) by mouth once daily in the morning. Take before meals. 4/16/25   Kathy Park DO   omeprazole (PriLOSEC) 40 mg DR capsule Take 1 capsule (40 mg) by mouth once daily in the morning. Take before meals. Do not crush or chew. 4/23/25 5/23/25  Niyah Adan MD   tamsulosin (Flomax) 0.4 mg 24 hr capsule Take 1 capsule (0.4 mg) by mouth once daily. 4/17/25 6/16/25  Alonzo Medrano MD        A ten-point review of systems was completed and is otherwise negative except for what is mentioned in the HPI above.    Physical Exam  Vitals reviewed.   HENT:      Head: Normocephalic.   Eyes:      Pupils: Pupils are equal, round, and reactive to light.   Cardiovascular:      Rate and Rhythm: Normal rate and regular rhythm.      Pulses: Normal pulses.   Pulmonary:      Effort: Pulmonary effort is normal.      Breath sounds: Normal breath sounds.   Abdominal:      General: Bowel sounds are normal.   Musculoskeletal:         General: Normal range of motion.      Cervical back: Normal range of motion.   Skin:     General: Skin is warm and dry.   Neurological:      Mental Status: He is alert and oriented to person, place, and time.   Psychiatric:         Mood and Affect: Mood normal.          PAT AIRWAY:   Airway:     Mallampati::  I    TM distance::  >3 FB    Neck ROM::  Full  normal        Testing/Diagnostic: CBC w/diff, BMP, urinalysis w/reflex, ecg    Patient Specialist/PCP: Kathy Park DO (PCP) 4/10/2025    Visit Vitals  /77   Pulse 84   Temp 35 °C (95 °F) (Tympanic)   Resp 18   Ht 1.803 m (5' 11\")   Wt 101 kg (222 lb 10.6 oz)   SpO2 98%   BMI " 31.06 kg/m²   Smoking Status Never   BSA 2.25 m²       DASI Risk Score      Flowsheet Row Pre-Admission Testing from 5/30/2025 in [facility] Questionnaire Series Submission from 5/17/2025 in [facility] OR with Generic Provider Italo   Can you take care of yourself (eat, dress, bathe, or use toilet)?  2.75 filed at 05/30/2025 1457 2.75  filed at 05/17/2025 0751   Can you walk indoors, such as around your house? 1.75 filed at 05/30/2025 1457 1.75  filed at 05/17/2025 0751   Can you walk a block or two on level ground?  2.75 filed at 05/30/2025 1457 2.75  filed at 05/17/2025 0751   Can you climb a flight of stairs or walk up a hill? 5.5 filed at 05/30/2025 1457 5.5  filed at 05/17/2025 0751   Can you run a short distance? 8 filed at 05/30/2025 1457 8  filed at 05/17/2025 0751   Can you do light work around the house like dusting or washing dishes? 2.7 filed at 05/30/2025 1457 2.7  filed at 05/17/2025 0751   Can you do moderate work around the house like vacuuming, sweeping floors or carrying groceries? 3.5 filed at 05/30/2025 1457 3.5  filed at 05/17/2025 0751   Can you do heavy work around the house like scrubbing floors or lifting and moving heavy furniture?  8 filed at 05/30/2025 1457 8  filed at 05/17/2025 0751   Can you do yard work like raking leaves, weeding or pushing a mower? 4.5 filed at 05/30/2025 1457 4.5  filed at 05/17/2025 0751   Can you have sexual relations? 5.25 filed at 05/30/2025 1457 5.25  filed at 05/17/2025 0751   Can you participate in moderate recreational activities like golf, bowling, dancing, doubles tennis or throwing a baseball or football? 6 filed at 05/30/2025 1457 6  filed at 05/17/2025 0751   Can you participate in strenous sports like swimming, singles tennis, football, basketball, or skiing? 7.5 filed at 05/30/2025 1457 7.5  filed at 05/17/2025 0751   DASI SCORE 58.2 filed at 05/30/2025 1457 58.2  filed at 05/17/2025 0751   METS Score (Will be calculated  only when all the questions are answered) 9.9 filed at 05/30/2025 1457 9.9  filed at 05/17/2025 0751          Caprini DVT Assessment    No data to display       Modified Frailty Index    No data to display       [id number] Stroke Risk Points  Current as of just now        N/A 0 to 9 Points:      Last Change: N/A          The [id number] risk score (Lip MACIEJ, et al. 2009. © 2010 [employer]) quantifies the risk of stroke for a patient with atrial fibrillation. For patients without atrial fibrillation or under the age of 18 this score appears as N/A. Higher score values generally indicate higher risk of stroke.        This score is not applicable to this patient. Components are not calculated.          Revised Cardiac Risk Index    No data to display       Apfel Simplified Score    No data to display       Risk Analysis Index Results This Encounter    No data found in the last 10 encounters.       Stop Bang Score      Flowsheet Row Pre-Admission Testing [facility] 5/30/20[facility] in [facility]   Do you snore loudly? 0 filed at 05/30/2025 1457   Do you often feel tired or fatigued after your sleep? 0 filed at 05/30/2025 1457   Has anyone ever observed you stop breathing in your sleep? 0 filed at 05/30/2025 1457   Do you have or are you being treated for high blood pressure? 1 filed at 05/30/2025 1457   Recent BMI (Calculated) 31.1 filed at 05/30/2025 1457   Is BMI greater than 35 kg/m2? 0=No filed at 05/30/2025 1457   Age older than 50 years old? 0=No filed at 05/30/2025 1457   Is your neck circumference greater than 17 inches (Male) or 16 inches (Female)? 0 filed at 05/30/2025 1457   Gender - Male 1=Yes filed at 05/30/2025 1457   STOP-BANG Total Score 2 filed at 05/30/2025 1457          Prodigy: High Risk  Total Score: 8              Prodigy Gender Score          ARISCAT Score for Postoperative Pulmonary Complications      Flowsheet Row Pre-Admission Testing [facility] 5/30/2025 in [facility]   Age Calculated Score 0 filed at 05/30/2025 1513   Preoperative SpO2 0 filed at 05/30/2025 1513   Respiratory infection in the last month Either upper or lower (i.e., URI, bronchitis, pneumonia), with fever and antibiotic treatment 0 filed at 05/30/2025 1513   Preoperative anemia (Hgb less than 10 g/dl) 0 filed at 05/30/2025 1513   Surgical incision  0 filed at 05/30/2025 1513   Duration of surgery  0 filed at 05/30/2025 1513   Emergency Procedure  0 filed at 05/30/2025 1513   ARISCAT Total Score  0 filed at 05/30/2025 1513          [place]sea Perioperative Risk for Myocardial Infarction or Cardiac Arrest (GURINDER)      Flowsheet Row Pre-Admission Testing from 5/30/2025 in [facility]   Calculated Age Score 0.92 filed at 05/30/2025 1514   Functional Status  0 filed at 05/30/2025 1514   ASA Class  -3.29 filed at 05/30/2025 1514   Creatinine 0 filed at 05/30/2025 1514   Type of Procedure  -0.26 filed at 05/30/2025 1514   GURINDER Total Score  -7.88 filed at 05/30/2025 1514   GURINDER % 0.04 filed at 05/30/2025 1514            Assessment and Plan:     # Asthma - continue inhaler; reports use of rescue inhaler only every few months  # HTN - continue hctz, hold lisinopril 24 hrs before surgery  # HLD - managed w/diet/lifestyle; followed by PCP w/CT cardiac scoring scheduled   # GERD/Schatzki's ring - continue omeprazole  # BPH - continue tamsulosin  # Ureteral calculus - Right ureteroscopic laser lithotripsy w/Dr Medrano on 6/10/2025    Ecg complete 4/15/2025 - NSR (76 bpm)  Medical hx, Allergies, VS and Labs reviewed  Medications addressed w/pre-op instructions provided             [1]   Past Medical History:  Diagnosis Date    Asthma     Hypertension     Kidney stone     Nephrolithiasis    [2]   Past Surgical History:  Procedure Laterality Date    CYSTOSCOPY      HERNIA REPAIR  1979    umbilical as baby    KIDNEY SURGERY      PUV surgery   [3]   Family History  Problem Relation Name Age of Onset    Diabetes  Mother      Peripheral vascular disease Father      Other (lympedema) Father     [4]   Allergies  Allergen Reactions    Penicillins Unknown     Childhood allergy-antony stomach

## 2025-05-30 NOTE — PREPROCEDURE INSTRUCTIONS
Medication List            Accurate as of May 30, 2025  3:08 PM. Always use your most recent med list.                acetaminophen 500 mg tablet  Commonly known as: Tylenol  Medication Adjustments for Surgery: Take/Use as prescribed     albuterol 90 mcg/actuation inhaler  Inhale 2 puffs every 4 hours if needed for wheezing or shortness of breath.  Medication Adjustments for Surgery: Take/Use as prescribed     ciprofloxacin 250 mg tablet  Commonly known as: Cipro  Take 1 tablet (250 mg) by mouth 2 times a day.  Medication Adjustments for Surgery: Take/Use as prescribed     fluticasone propion-salmeteroL 100-50 mcg/dose diskus inhaler  Commonly known as: Wixela Inhub  Inhale 1 puff 2 times a day.  Medication Adjustments for Surgery: Take/Use as prescribed     * hydroCHLOROthiazide 25 mg tablet  Commonly known as: HYDRODiuril  Take 1 tablet (25 mg) by mouth once daily.  Medication Adjustments for Surgery: Take/Use as prescribed     * hydroCHLOROthiazide 25 mg tablet  Commonly known as: HYDRODiuril  Take 1 tablet (25 mg) by mouth once daily.  Medication Adjustments for Surgery: Take/Use as prescribed     ketorolac 10 mg tablet  Commonly known as: Toradol  Take 1 tablet (10 mg) by mouth every 6 hours if needed for moderate pain (4 - 6).  Additional Medication Adjustments for Surgery: Take last dose 7 days before surgery     lisinopril 5 mg tablet  Take 1 tablet (5 mg) by mouth once daily.  Medication Adjustments for Surgery: Take/Use as prescribed     multivitamin tablet  Additional Medication Adjustments for Surgery: Take last dose 7 days before surgery     * omeprazole 40 mg DR capsule  Commonly known as: PriLOSEC  Take 1 capsule (40 mg) by mouth once daily in the morning. Take before meals.  Medication Adjustments for Surgery: Take/Use as prescribed     * omeprazole 40 mg DR capsule  Commonly known as: PriLOSEC  Take 1 capsule (40 mg) by mouth once daily in the morning. Take before meals. Do not crush or  chew.  Medication Adjustments for Surgery: Take/Use as prescribed     tamsulosin 0.4 mg 24 hr capsule  Commonly known as: Flomax  Take 1 capsule (0.4 mg) by mouth once daily.  Medication Adjustments for Surgery: Take/Use as prescribed           * This list has 4 medication(s) that are the same as other medications prescribed for you. Read the directions carefully, and ask your doctor or other care provider to review them with you.                                  NPO Instructions:    Do not eat any food after midnight the night before your surgery/procedure.    Additional Instructions:     Day of Surgery:  You may have clear liquids until TWO hours before surgery/procedure.  This includes water, black tea/coffee, (no milk or cream) apple juice and electrolyte drinks (Gatorade)  Wear  comfortable loose fitting clothing  Do not use moisturizers, creams, lotions or perfume  All jewelry and valuables should be left at home    PRE-OPERATIVE INSTRUCTIONS FOR SURGERY    *Do not eat anything after midnight the night of surgery.  This includes food of any kind (including hard candy, cough drops, mints).     You may have up to 13 ounces of clear liquid until TWO hours prior to your scheduled arrival time  Clear liquids include water, black tea/coffee, (no milk or cream) apple juice and electrolyte drinks (GATORADE).  You may chew gum until TWO hours prior you your surgery/procedure.     *One of our staff members will call you ONE business day before your surgery, between 11am-2 pm to let you know the time to arrive.  If you have not received a call by 2 pm, call [phone]    *When you arrive at the hospital-->GO TO Registration on the ground floor  *Stop smoking 24 hours prior to surgery.  No Marijuana, CBD Oil or Vaping for 48 hours  *No alcohol 24 hours prior to surgery  *You will need a responsible adult to drive you home  -No acrylic nails or nail polish on at least one fingernail, NO polish on toes for foot  surgery  -You may be asked to remove your dentures, partial plate, eyeglasses or contact lenses before going to surgery.  Please bring a case for these items.  -Body piercings need to be removed.  Jewelry and valuables should be left at home.  -Put on loose,  comfortable, clean clothing, that will accommodate bandages    *If you have any further questions about your pre-op instructions,  not mentioned in this handout, then call [phone]

## 2025-05-30 NOTE — CPM/PAT H&P
"CPM/PAT Evaluation       Name: Pawel Mena (Pawel Mena \"Mike\")  /Age: 1980/45 y.o.     In-Person       Chief Complaint: PAT for planned surgery    45 yr old male w/PHx of asthma, HTN, HLD, GERD, and ureteral calculus referred to PAT for planned Right ureteroscopic laser lithotripsy w/Dr Medrano on 6/10/2025      Patient reports feeling overall well, denies fever, cough or recent infection. Denies hx of stroke, seizure or blood clot.  Reports remaining otherwise physically active routinely walking/hiking and playing with family; denies cardiac or respiratory symptoms. Past surgical hx includes kidney procedure () and Right ureteroscopy w/tretrograde pyelogram/JJ stent (4/15/2025); denies past issues with anesthesia.      Followed by PCP (Kathy Park DO) 4/10/2025        Medical History[1]    Surgical History[2]    Patient  reports being sexually active and has had partner(s) who are female. He reports using the following method of birth control/protection: None.    Family History[3]    Allergies[4]    Prior to Admission medications    Medication Sig Start Date End Date Taking? Authorizing Provider   acetaminophen (Tylenol) 500 mg tablet Take 2 tablets (1,000 mg) by mouth.    Historical Provider, MD   albuterol 90 mcg/actuation inhaler Inhale 2 puffs every 4 hours if needed for wheezing or shortness of breath. 25   Jose Bennett MD   ciprofloxacin (Cipro) 250 mg tablet Take 1 tablet (250 mg) by mouth 2 times a day.  Patient not taking: Reported on 2025 4/15/25   Alonzo Medrano MD   fluticasone propion-salmeteroL (Wixela Inhub) 100-50 mcg/dose diskus inhaler Inhale 1 puff 2 times a day. 25   Jose Bennett MD   hydroCHLOROthiazide (HYDRODiuril) 25 mg tablet Take 1 tablet (25 mg) by mouth once daily. 25  Jose Bennett MD   hydroCHLOROthiazide (HYDRODiuril) 25 mg tablet Take 1 tablet (25 mg) by mouth once daily. 25  Niyah Adan MD   ketorolac " "(Toradol) 10 mg tablet Take 1 tablet (10 mg) by mouth every 6 hours if needed for moderate pain (4 - 6). 4/1/25   Alonzo Medrano MD   lisinopril 5 mg tablet Take 1 tablet (5 mg) by mouth once daily. 4/16/25 4/16/26  Jose Bennett MD   multivitamin tablet Take 1 tablet by mouth once daily.    Historical Provider, MD   omeprazole (PriLOSEC) 40 mg DR capsule Take 1 capsule (40 mg) by mouth once daily in the morning. Take before meals. 4/16/25   Kathy Park DO   omeprazole (PriLOSEC) 40 mg DR capsule Take 1 capsule (40 mg) by mouth once daily in the morning. Take before meals. Do not crush or chew. 4/23/25 5/23/25  Niyah Adan MD   tamsulosin (Flomax) 0.4 mg 24 hr capsule Take 1 capsule (0.4 mg) by mouth once daily. 4/17/25 6/16/25  Alonzo Medrano MD        A ten-point review of systems was completed and is otherwise negative except for what is mentioned in the HPI above.    Physical Exam  Vitals reviewed.   HENT:      Head: Normocephalic.   Eyes:      Pupils: Pupils are equal, round, and reactive to light.   Cardiovascular:      Rate and Rhythm: Normal rate and regular rhythm.      Pulses: Normal pulses.   Pulmonary:      Effort: Pulmonary effort is normal.      Breath sounds: Normal breath sounds.   Abdominal:      General: Bowel sounds are normal.   Musculoskeletal:         General: Normal range of motion.      Cervical back: Normal range of motion.   Skin:     General: Skin is warm and dry.   Neurological:      Mental Status: He is alert and oriented to person, place, and time.   Psychiatric:         Mood and Affect: Mood normal.          PAT AIRWAY:   Airway:     Mallampati::  I    TM distance::  >3 FB    Neck ROM::  Full  normal        Testing/Diagnostic: CBC w/diff, BMP, urinalysis w/reflex, ecg    Patient Specialist/PCP: Kathy Park DO (PCP) 4/10/2025    Visit Vitals  /77   Pulse 84   Temp 35 °C (95 °F) (Tympanic)   Resp 18   Ht 1.803 m (5' 11\")   Wt 101 kg (222 lb 10.6 oz)   SpO2 98%   BMI " 31.06 kg/m²   Smoking Status Never   BSA 2.25 m²       DASI Risk Score      Flowsheet Row Pre-Admission Testing from 5/30/2025 in [facility] Questionnaire Series Submission from 5/17/2025 in [facility] OR with Generic Provider Italo   Can you take care of yourself (eat, dress, bathe, or use toilet)?  2.75 filed at 05/30/2025 1457 2.75  filed at 05/17/2025 0751   Can you walk indoors, such as around your house? 1.75 filed at 05/30/2025 1457 1.75  filed at 05/17/2025 0751   Can you walk a block or two on level ground?  2.75 filed at 05/30/2025 1457 2.75  filed at 05/17/2025 0751   Can you climb a flight of stairs or walk up a hill? 5.5 filed at 05/30/2025 1457 5.5  filed at 05/17/2025 0751   Can you run a short distance? 8 filed at 05/30/2025 1457 8  filed at 05/17/2025 0751   Can you do light work around the house like dusting or washing dishes? 2.7 filed at 05/30/2025 1457 2.7  filed at 05/17/2025 0751   Can you do moderate work around the house like vacuuming, sweeping floors or carrying groceries? 3.5 filed at 05/30/2025 1457 3.5  filed at 05/17/2025 0751   Can you do heavy work around the house like scrubbing floors or lifting and moving heavy furniture?  8 filed at 05/30/2025 1457 8  filed at 05/17/2025 0751   Can you do yard work like raking leaves, weeding or pushing a mower? 4.5 filed at 05/30/2025 1457 4.5  filed at 05/17/2025 0751   Can you have sexual relations? 5.25 filed at 05/30/2025 1457 5.25  filed at 05/17/2025 0751   Can you participate in moderate recreational activities like golf, bowling, dancing, doubles tennis or throwing a baseball or football? 6 filed at 05/30/2025 1457 6  filed at 05/17/2025 0751   Can you participate in strenous sports like swimming, singles tennis, football, basketball, or skiing? 7.5 filed at 05/30/2025 1457 7.5  filed at 05/17/2025 0751   DASI SCORE 58.2 filed at 05/30/2025 1457 58.2  filed at 05/17/2025 0751   METS Score (Will be calculated  only when all the questions are answered) 9.9 filed at 05/30/2025 1457 9.9  filed at 05/17/2025 0751          Caprini DVT Assessment    No data to display       Modified Frailty Index    No data to display       [id number] Stroke Risk Points  Current as of just now        N/A 0 to 9 Points:      Last Change: N/A          The [id number] risk score (Lip MACIEJ, et al. 2009. © 2010 [employer]) quantifies the risk of stroke for a patient with atrial fibrillation. For patients without atrial fibrillation or under the age of 18 this score appears as N/A. Higher score values generally indicate higher risk of stroke.        This score is not applicable to this patient. Components are not calculated.          Revised Cardiac Risk Index    No data to display       Apfel Simplified Score    No data to display       Risk Analysis Index Results This Encounter    No data found in the last 10 encounters.       Stop Bang Score      Flowsheet Row Pre-Admission Testing from 5/30/2025 in [facility]   Do you snore loudly? 0 filed at 05/30/2025 1457   Do you often feel tired or fatigued after your sleep? 0 filed at 05/30/2025 1457   Has anyone ever observed you stop breathing in your sleep? 0 filed at 05/30/2025 1457   Do you have or are you being treated for high blood pressure? 1 filed at 05/30/2025 1457   Recent BMI (Calculated) 31.1 filed at 05/30/2025 1457   Is BMI greater than 35 kg/m2? 0=No filed at 05/30/2025 1457   Age older than 50 years old? 0=No filed at 05/30/2025 1457   Is your neck circumference greater than 17 inches (Male) or 16 inches (Female)? 0 filed at 05/30/2025 1457   Gender - Male 1=Yes filed at 05/30/2025 1457   STOP-BANG Total Score 2 filed at 05/30/2025 1457          Prodigy: High Risk  Total Score: 8              Prodigy Gender Score          ARISCAT Score for Postoperative Pulmonary Complications      Flowsheet Row Pre-Admission Testing [facility] 5/30/2025 in [facility]   Age Calculated Score 0 filed at 05/30/2025 1513   Preoperative SpO2 0 filed at 05/30/2025 1513   Respiratory infection in the last month Either upper or lower (i.e., URI, bronchitis, pneumonia), with fever and antibiotic treatment 0 filed at 05/30/2025 1513   Preoperative anemia (Hgb less than 10 g/dl) 0 filed at 05/30/2025 1513   Surgical incision  0 filed at 05/30/2025 1513   Duration of surgery  0 filed at 05/30/2025 1513   Emergency Procedure  0 filed at 05/30/2025 1513   ARISCAT Total Score  0 filed at 05/30/2025 1513          Chelsea Perioperative Risk for Myocardial Infarction or Cardiac Arrest (GURINDER)      Flowsheet Row Pre-Admission Testing from 5/30/2025 in [facility]   Calculated Age Score 0.92 filed at 05/30/2025 1514   Functional Status  0 filed at 05/30/2025 1514   ASA Class  -3.29 filed at 05/30/2025 1514   Creatinine 0 filed at 05/30/2025 1514   Type of Procedure  -0.26 filed at 05/30/2025 1514   GURINDER Total Score  -7.88 filed at 05/30/2025 1514   GURINDER % 0.04 filed at 05/30/2025 1514            Assessment and Plan:     # Asthma - continue inhaler; reports use of rescue inhaler only every few months  # HTN - continue hctz, hold lisinopril 24 hrs before surgery  # HLD - managed w/diet/lifestyle; followed by PCP w/CT cardiac scoring scheduled   # GERD/Schatzki's ring - continue omeprazole  # BPH - continue tamsulosin  # Ureteral calculus - Right ureteroscopic laser lithotripsy w/Dr Medrano on 6/10/2025    Ecg complete 4/15/2025 - NSR (76 bpm)  Medical hx, Allergies, VS and Labs reviewed  Medications addressed w/pre-op instructions provided             [1]   Past Medical History:  Diagnosis Date    Asthma     Hypertension     Kidney stone     Nephrolithiasis    [2]   Past Surgical History:  Procedure Laterality Date    CYSTOSCOPY      HERNIA REPAIR  1979    umbilical as baby    KIDNEY SURGERY      PUV surgery   [3]   Family History  Problem Relation Name Age of Onset    Diabetes  Mother      Peripheral vascular disease Father      Other (lympedema) Father     [4]   Allergies  Allergen Reactions    Penicillins Unknown     Childhood allergy-antony stomach

## 2025-05-31 LAB
BACTERIA UR CULT: NORMAL
HOLD SPECIMEN: NORMAL

## 2025-06-10 ENCOUNTER — ANESTHESIA EVENT (OUTPATIENT)
Dept: OPERATING ROOM | Facility: HOSPITAL | Age: 45
End: 2025-06-10
Payer: COMMERCIAL

## 2025-06-10 ENCOUNTER — HOSPITAL ENCOUNTER (OUTPATIENT)
Facility: HOSPITAL | Age: 45
Setting detail: OUTPATIENT SURGERY
Discharge: HOME | End: 2025-06-10
Attending: UROLOGY | Admitting: UROLOGY
Payer: COMMERCIAL

## 2025-06-10 ENCOUNTER — ANESTHESIA (OUTPATIENT)
Dept: OPERATING ROOM | Facility: HOSPITAL | Age: 45
End: 2025-06-10
Payer: COMMERCIAL

## 2025-06-10 VITALS
TEMPERATURE: 97.9 F | OXYGEN SATURATION: 97 % | RESPIRATION RATE: 16 BRPM | SYSTOLIC BLOOD PRESSURE: 125 MMHG | HEART RATE: 69 BPM | BODY MASS INDEX: 31.17 KG/M2 | WEIGHT: 222.66 LBS | HEIGHT: 71 IN | DIASTOLIC BLOOD PRESSURE: 72 MMHG

## 2025-06-10 DIAGNOSIS — K22.2 SCHATZKI'S RING: ICD-10-CM

## 2025-06-10 DIAGNOSIS — N20.1 URETERAL CALCULUS: Primary | ICD-10-CM

## 2025-06-10 PROCEDURE — 2500000004 HC RX 250 GENERAL PHARMACY W/ HCPCS (ALT 636 FOR OP/ED): Performed by: UROLOGY

## 2025-06-10 PROCEDURE — 3600000009 HC OR TIME - EACH INCREMENTAL 1 MINUTE - PROCEDURE LEVEL FOUR: Performed by: UROLOGY

## 2025-06-10 PROCEDURE — 7100000002 HC RECOVERY ROOM TIME - EACH INCREMENTAL 1 MINUTE: Performed by: UROLOGY

## 2025-06-10 PROCEDURE — 3600000004 HC OR TIME - INITIAL BASE CHARGE - PROCEDURE LEVEL FOUR: Performed by: UROLOGY

## 2025-06-10 PROCEDURE — A52356 PR CYSTO/URETERO W/LITHOTRIPSY  AND INDWELL STENT INSRT: Performed by: NURSE ANESTHETIST, CERTIFIED REGISTERED

## 2025-06-10 PROCEDURE — 82365 CALCULUS SPECTROSCOPY: CPT | Performed by: UROLOGY

## 2025-06-10 PROCEDURE — A52356 PR CYSTO/URETERO W/LITHOTRIPSY  AND INDWELL STENT INSRT: Performed by: ANESTHESIOLOGY

## 2025-06-10 PROCEDURE — 3700000002 HC GENERAL ANESTHESIA TIME - EACH INCREMENTAL 1 MINUTE: Performed by: UROLOGY

## 2025-06-10 PROCEDURE — 2720000007 HC OR 272 NO HCPCS: Performed by: UROLOGY

## 2025-06-10 PROCEDURE — 7100000010 HC PHASE TWO TIME - EACH INCREMENTAL 1 MINUTE: Performed by: UROLOGY

## 2025-06-10 PROCEDURE — 3700000001 HC GENERAL ANESTHESIA TIME - INITIAL BASE CHARGE: Performed by: UROLOGY

## 2025-06-10 PROCEDURE — 52353 CYSTOURETERO W/LITHOTRIPSY: CPT | Performed by: UROLOGY

## 2025-06-10 PROCEDURE — 7100000001 HC RECOVERY ROOM TIME - INITIAL BASE CHARGE: Performed by: UROLOGY

## 2025-06-10 PROCEDURE — 7100000009 HC PHASE TWO TIME - INITIAL BASE CHARGE: Performed by: UROLOGY

## 2025-06-10 PROCEDURE — C1889 IMPLANT/INSERT DEVICE, NOC: HCPCS | Performed by: UROLOGY

## 2025-06-10 PROCEDURE — 2500000005 HC RX 250 GENERAL PHARMACY W/O HCPCS: Performed by: UROLOGY

## 2025-06-10 PROCEDURE — 2500000004 HC RX 250 GENERAL PHARMACY W/ HCPCS (ALT 636 FOR OP/ED): Performed by: NURSE ANESTHETIST, CERTIFIED REGISTERED

## 2025-06-10 RX ORDER — METOPROLOL TARTRATE 1 MG/ML
5 INJECTION, SOLUTION INTRAVENOUS ONCE
Status: DISCONTINUED | OUTPATIENT
Start: 2025-06-10 | End: 2025-06-10 | Stop reason: HOSPADM

## 2025-06-10 RX ORDER — SODIUM CHLORIDE 0.9 G/100ML
INJECTION, SOLUTION IRRIGATION AS NEEDED
Status: DISCONTINUED | OUTPATIENT
Start: 2025-06-10 | End: 2025-06-10 | Stop reason: HOSPADM

## 2025-06-10 RX ORDER — ALBUTEROL SULFATE 0.83 MG/ML
2.5 SOLUTION RESPIRATORY (INHALATION) ONCE AS NEEDED
Status: DISCONTINUED | OUTPATIENT
Start: 2025-06-10 | End: 2025-06-10 | Stop reason: HOSPADM

## 2025-06-10 RX ORDER — SCOPOLAMINE 1 MG/3D
1 PATCH, EXTENDED RELEASE TRANSDERMAL ONCE
Status: DISCONTINUED | OUTPATIENT
Start: 2025-06-10 | End: 2025-06-10 | Stop reason: HOSPADM

## 2025-06-10 RX ORDER — MEPERIDINE HYDROCHLORIDE 50 MG/ML
12.5 INJECTION INTRAMUSCULAR; INTRAVENOUS; SUBCUTANEOUS EVERY 10 MIN PRN
Status: DISCONTINUED | OUTPATIENT
Start: 2025-06-10 | End: 2025-06-10 | Stop reason: HOSPADM

## 2025-06-10 RX ORDER — MORPHINE SULFATE 2 MG/ML
2 INJECTION, SOLUTION INTRAMUSCULAR; INTRAVENOUS EVERY 5 MIN PRN
Status: DISCONTINUED | OUTPATIENT
Start: 2025-06-10 | End: 2025-06-10 | Stop reason: HOSPADM

## 2025-06-10 RX ORDER — MIDAZOLAM HYDROCHLORIDE 1 MG/ML
1 INJECTION, SOLUTION INTRAMUSCULAR; INTRAVENOUS ONCE AS NEEDED
Status: DISCONTINUED | OUTPATIENT
Start: 2025-06-10 | End: 2025-06-10 | Stop reason: HOSPADM

## 2025-06-10 RX ORDER — FAMOTIDINE 10 MG/ML
20 INJECTION, SOLUTION INTRAVENOUS ONCE
Status: DISCONTINUED | OUTPATIENT
Start: 2025-06-10 | End: 2025-06-10 | Stop reason: HOSPADM

## 2025-06-10 RX ORDER — HYDRALAZINE HYDROCHLORIDE 20 MG/ML
10 INJECTION INTRAMUSCULAR; INTRAVENOUS EVERY 30 MIN PRN
Status: DISCONTINUED | OUTPATIENT
Start: 2025-06-10 | End: 2025-06-10 | Stop reason: HOSPADM

## 2025-06-10 RX ORDER — MIDAZOLAM HYDROCHLORIDE 1 MG/ML
INJECTION, SOLUTION INTRAMUSCULAR; INTRAVENOUS AS NEEDED
Status: DISCONTINUED | OUTPATIENT
Start: 2025-06-10 | End: 2025-06-10

## 2025-06-10 RX ORDER — ONDANSETRON HYDROCHLORIDE 2 MG/ML
4 INJECTION, SOLUTION INTRAVENOUS ONCE AS NEEDED
Status: DISCONTINUED | OUTPATIENT
Start: 2025-06-10 | End: 2025-06-10 | Stop reason: HOSPADM

## 2025-06-10 RX ORDER — CIPROFLOXACIN 2 MG/ML
400 INJECTION, SOLUTION INTRAVENOUS ONCE
Status: COMPLETED | OUTPATIENT
Start: 2025-06-10 | End: 2025-06-10

## 2025-06-10 RX ORDER — ACETAMINOPHEN 325 MG/1
975 TABLET ORAL ONCE
Status: DISCONTINUED | OUTPATIENT
Start: 2025-06-10 | End: 2025-06-10 | Stop reason: HOSPADM

## 2025-06-10 RX ORDER — HYDROMORPHONE HYDROCHLORIDE 1 MG/ML
1 INJECTION, SOLUTION INTRAMUSCULAR; INTRAVENOUS; SUBCUTANEOUS EVERY 5 MIN PRN
Status: DISCONTINUED | OUTPATIENT
Start: 2025-06-10 | End: 2025-06-10 | Stop reason: HOSPADM

## 2025-06-10 RX ORDER — LIDOCAINE HCL/PF 100 MG/5ML
SYRINGE (ML) INTRAVENOUS AS NEEDED
Status: DISCONTINUED | OUTPATIENT
Start: 2025-06-10 | End: 2025-06-10

## 2025-06-10 RX ORDER — DIPHENHYDRAMINE HYDROCHLORIDE 50 MG/ML
25 INJECTION, SOLUTION INTRAMUSCULAR; INTRAVENOUS ONCE AS NEEDED
Status: DISCONTINUED | OUTPATIENT
Start: 2025-06-10 | End: 2025-06-10 | Stop reason: HOSPADM

## 2025-06-10 RX ORDER — WATER 1 ML/ML
INJECTION IRRIGATION AS NEEDED
Status: DISCONTINUED | OUTPATIENT
Start: 2025-06-10 | End: 2025-06-10 | Stop reason: HOSPADM

## 2025-06-10 RX ORDER — CIPROFLOXACIN 250 MG/1
250 TABLET, FILM COATED ORAL 2 TIMES DAILY
Qty: 10 TABLET | Refills: 0 | Status: SHIPPED | OUTPATIENT
Start: 2025-06-10 | End: 2025-06-15

## 2025-06-10 RX ORDER — PROPOFOL 10 MG/ML
INJECTION, EMULSION INTRAVENOUS AS NEEDED
Status: DISCONTINUED | OUTPATIENT
Start: 2025-06-10 | End: 2025-06-10

## 2025-06-10 RX ORDER — METOCLOPRAMIDE HYDROCHLORIDE 5 MG/ML
10 INJECTION INTRAMUSCULAR; INTRAVENOUS ONCE AS NEEDED
Status: DISCONTINUED | OUTPATIENT
Start: 2025-06-10 | End: 2025-06-10 | Stop reason: HOSPADM

## 2025-06-10 RX ORDER — FENTANYL CITRATE 50 UG/ML
INJECTION, SOLUTION INTRAMUSCULAR; INTRAVENOUS AS NEEDED
Status: DISCONTINUED | OUTPATIENT
Start: 2025-06-10 | End: 2025-06-10

## 2025-06-10 RX ORDER — ONDANSETRON HYDROCHLORIDE 2 MG/ML
INJECTION, SOLUTION INTRAVENOUS AS NEEDED
Status: DISCONTINUED | OUTPATIENT
Start: 2025-06-10 | End: 2025-06-10

## 2025-06-10 RX ORDER — SODIUM CHLORIDE, SODIUM LACTATE, POTASSIUM CHLORIDE, CALCIUM CHLORIDE 600; 310; 30; 20 MG/100ML; MG/100ML; MG/100ML; MG/100ML
100 INJECTION, SOLUTION INTRAVENOUS CONTINUOUS
Status: DISCONTINUED | OUTPATIENT
Start: 2025-06-10 | End: 2025-06-10 | Stop reason: HOSPADM

## 2025-06-10 RX ORDER — LABETALOL HYDROCHLORIDE 5 MG/ML
10 INJECTION, SOLUTION INTRAVENOUS ONCE AS NEEDED
Status: DISCONTINUED | OUTPATIENT
Start: 2025-06-10 | End: 2025-06-10 | Stop reason: HOSPADM

## 2025-06-10 RX ADMIN — PROPOFOL 200 MG: 10 INJECTION, EMULSION INTRAVENOUS at 10:20

## 2025-06-10 RX ADMIN — LIDOCAINE HYDROCHLORIDE 100 MG: 20 INJECTION, SOLUTION INTRAVENOUS at 10:20

## 2025-06-10 RX ADMIN — CIPROFLOXACIN 400 MG: 400 INJECTION, SOLUTION INTRAVENOUS at 09:27

## 2025-06-10 RX ADMIN — SODIUM CHLORIDE: 9 INJECTION, SOLUTION INTRAVENOUS at 10:14

## 2025-06-10 RX ADMIN — MIDAZOLAM 2 MG: 1 INJECTION INTRAMUSCULAR; INTRAVENOUS at 10:20

## 2025-06-10 RX ADMIN — ONDANSETRON 4 MG: 2 INJECTION, SOLUTION INTRAMUSCULAR; INTRAVENOUS at 10:40

## 2025-06-10 RX ADMIN — FENTANYL CITRATE 25 MCG: 50 INJECTION, SOLUTION INTRAMUSCULAR; INTRAVENOUS at 10:39

## 2025-06-10 RX ADMIN — DEXAMETHASONE SODIUM PHOSPHATE 4 MG: 4 INJECTION, SOLUTION INTRAMUSCULAR; INTRAVENOUS at 10:39

## 2025-06-10 SDOH — HEALTH STABILITY: MENTAL HEALTH: CURRENT SMOKER: 0

## 2025-06-10 ASSESSMENT — COLUMBIA-SUICIDE SEVERITY RATING SCALE - C-SSRS
1. IN THE PAST MONTH, HAVE YOU WISHED YOU WERE DEAD OR WISHED YOU COULD GO TO SLEEP AND NOT WAKE UP?: NO
2. HAVE YOU ACTUALLY HAD ANY THOUGHTS OF KILLING YOURSELF?: NO
6. HAVE YOU EVER DONE ANYTHING, STARTED TO DO ANYTHING, OR PREPARED TO DO ANYTHING TO END YOUR LIFE?: NO

## 2025-06-10 ASSESSMENT — PAIN SCALES - GENERAL
PAINLEVEL_OUTOF10: 3
PAINLEVEL_OUTOF10: 0 - NO PAIN
PAINLEVEL_OUTOF10: 0 - NO PAIN
PAINLEVEL_OUTOF10: 3
PAINLEVEL_OUTOF10: 0 - NO PAIN
PAINLEVEL_OUTOF10: 3

## 2025-06-10 ASSESSMENT — PAIN DESCRIPTION - DESCRIPTORS: DESCRIPTORS: DULL

## 2025-06-10 ASSESSMENT — PAIN - FUNCTIONAL ASSESSMENT
PAIN_FUNCTIONAL_ASSESSMENT: 0-10
PAIN_FUNCTIONAL_ASSESSMENT: 0-10

## 2025-06-10 NOTE — ANESTHESIA PREPROCEDURE EVALUATION
"Patient: Pawel Mena \"Mike\"    Procedure Information       Date/Time: 06/10/25 1010    Procedure: RIGHT URETEROSCOPIC LASER LITHOTRIPSY (Right)    Location: [facility] / [facility]    Surgeons: Alonzo Medrano MD            Relevant Problems   Anesthesia   (-) Difficult intubation   (-) PONV (postoperative nausea and vomiting)      Cardiac   (+) Hyperlipidemia   (+) Hypertension      Pulmonary   (+) Asthma   (+) CONSTANTINO (obstructive sleep apnea)      Neuro   (+) Anxiety   (+) Meralgia paraesthetica      GI   (+) Esophageal reflux      Musculoskeletal   (+) Chronic bilateral low back pain with right-sided sciatica       Clinical information reviewed:    Allergies  Meds               NPO Detail:  NPO/Void Status  Carbohydrate Drink Given Prior to Surgery? : N  Date of Last Liquid: 06/09/25  Time of Last Liquid: 0630  Date of Last Solid: 06/09/25  Time of Last Solid: 1900  Last Intake Type: Solid meal  Time of Last Void: 0917         Physical Exam    Airway  Mallampati: III  TM distance: >3 FB  Neck ROM: full     Cardiovascular - normal exam  Rhythm: regular  Rate: normal     Dental    Pulmonary - normal exam   Abdominal            Anesthesia Plan    History of general anesthesia?: yes  History of complications of general anesthesia?: no    ASA 3     general   (LMA #5 successfully  used with last cystoscopy)  The patient is not a current smoker.  Education provided regarding risk of obstructive sleep apnea.  intravenous induction   Postoperative pain plan includes opioids.  Trial extubation is planned.  Anesthetic plan and risks discussed with patient.    Plan discussed with CRNA, CAA and attending.      "

## 2025-06-10 NOTE — BRIEF OP NOTE
"Date: 6/10/2025  OR Location: [facility]    Name: Pawel Mena \"Mike\", : 1980, Age: 45 y.o., MRN: [id number], Sex: male    Diagnosis  Pre-op Diagnosis      * Ureteral calculus [N20.1] Post-op Diagnosis     * Ureteral calculus [N20.1]     Procedures  RIGHT URETEROSCOPIC LASER LITHOTRIPSY  91099 - FL CYSTO W/URETEROSCOPY W/LITHOTRIPSY      Surgeons      * Alonzo Medrano - Primary    Resident/Fellow/Other Assistant:  Surgeons and Role:  * No surgeons found with a matching role *    Staff:   Circulator: Krysten Pineda Person: Sara    Anesthesia Staff: Anesthesiologist: Don Valadez MD  CRNA: MISHA Martinez-ODILON    Procedure Summary  Anesthesia: General  ASA: III  Estimated Blood Loss: 0mL  Intra-op Medications:   Administrations occurring from 1010 to 1120 on 06/10/25:   Medication Name Total Dose   sterile water irrigation solution 1,000 mL   sodium chloride 0.9 % irrigation solution 3,000 mL   dexAMETHasone (Decadron) 4 mg/mL IV Syringe 2 mL 4 mg   fentaNYL PF 0.05 mg/mL 25 mcg   lidocaine PF (cardiac) syringe 2% 100 mg   midazolam (Versed) 1 mg/1 mL 2 mg   ondansetron (Zofran) 2 mg/mL injection 4 mg   50 mL propofol 10mg/mL 200 mg   NaCl 0.9 % bolus Cannot be calculated              Anesthesia Record               Intraprocedure I/O Totals          Intake    NaCl 0.9 % bolus 250.00 mL    Propofol Drip 0.00 mL    The total shown is the total volume documented since Anesthesia Start was filed.    Total Intake 250 mL          Specimen:   ID Type Source Tests Collected by Time   A : right ureteral stone Calculus Ureter, Right CALCULI (STONE) ANALYSIS Alonzo Medrano MD 6/10/2025 1045                  Findings: This is a 45-year-old male who initially presented to me approximately 4 weeks ago with an obstructing distal right ureteral calculus that was causing severe colicky pain.  An attempt was made to perform ureteroscopic laser lithotripsy but because of swelling I was unable to perform the " procedure and instead left a double-J ureteral stent indwelling.  The patient was recently seen in my office and is now ready to undergo a second attempted right ureteroscopic laser lithotripsy.  He now comes into the holding area in preparation for this  While in the holding area a huddle was performed with the patient his wife the operating room staff and myself and all agreed that he is to undergo cystoscopy, removal of the right ureteral stent followed by right ureteroscopic laser lithotripsy.  The patient was brought into the operating room and while on the transport cart a second huddle was performed and again the patient, the operating room staff and myself all agreed that the patient was to undergo a right ureteroscopic laser lithotripsy after removing the indwelling right ureteral stent.  The patient was now placed on the operating table and after general anesthesia was induced he was positioned in the dorsolithotomy position.  His perineum and genital area prepped and draped in usual sterile manner.  A third huddle was performed and the operating room staff anesthesia and myself all agreed that he was to undergo cystoscopy, removal of the right ureteral stent followed by right ureteroscopic laser lithotripsy.  At this point a #22 Frisian Storz cystoscope was introduced into the urethra and under direct vision passed into the bladder.  The right ureteral stent was quickly identified and grasped with grasping forceps.  It was extracted from the ureter in its entirety without difficulty.  A rigid ureteroscope was then introduced into the urethral meatus and under direct vision passed into the bladder.  The right ureteral orifice was identified and cannulated with the tip of the ureteroscope.  Under direct vision the ureteroscope was advanced through the intramural portion of the ureter at which time a large dark brown calculus was identified.  Using the holmium laser this calculus was broken up into multiple  small fragments.  The fragments were then removed from the ureter using a 4 wire stone basket.  After removing all of the fragments the ureteroscope was reintroduced into the ureter and under direct vision I carefully inspected the area of lithotripsy.  There did not appear to be any damage to the ureter and I did not feel that a ureteral stent was indicated.  The ureteroscope was advanced under direct vision into the proximal ureter and ultimately into the renal pelvis.  No other stones were seen.  The ureteroscope was then withdrawn and under direct vision the entire ureter was examined and found to be intact and free of any trauma.  The ureteroscope was removed.  The bladder was drained.  The patient was placed in the supine position, awakened and transferred to the recovery room in satisfactory condition      Complications:  None; patient tolerated the procedure well.     Disposition: PACU - hemodynamically stable.  Condition: stable  Specimens Collected:   ID Type Source Tests Collected by Time   A : right ureteral stone Calculus Ureter, Right CALCULI (STONE) ANALYSIS Alonzo Medrano MD 6/10/2025 8589     Attending Attestation: I was present and scrubbed for the entire procedure.    Alonzo Medrano  Phone Number: [phone]

## 2025-06-10 NOTE — ANESTHESIA PROCEDURE NOTES
Airway  Date/Time: 6/10/2025 10:25 AM  Reason: elective    Airway not difficult    Staffing  Performed: attending   Authorized by: Don Valadez MD    Performed by: MISHA Martinez-ODILON  Patient location during procedure: OR    Patient Condition  Indications for airway management: anesthesia  Planned trial extubation    Final Airway Details   Preoxygenated: yes  Final airway type: supraglottic airway  Successful airway: Supraglottic airway: Igel.  Size: 5  Number of attempts at approach: 1

## 2025-06-10 NOTE — ANESTHESIA POSTPROCEDURE EVALUATION
"Patient: Pawel Mena \"Mike\"    Procedure Summary       Date: 06/10/25 Room / Location: [facility] / [facility]    Anesthesia Start: 1015 Anesthesia Stop: 1051    Procedure: RIGHT URETEROSCOPIC LASER LITHOTRIPSY (Right) Diagnosis:       Ureteral calculus      (Ureteral calculus [N20.1])    Surgeons: Alonzo Medrano MD Responsible Provider: Don Valadez MD    Anesthesia Type: general ASA Status: 3            Anesthesia Type: general    Vitals Value Taken Time   /69 06/10/25 11:00   Temp 36.5 06/10/25 11:14   Pulse 77 06/10/25 11:12   Resp 15 06/10/25 11:12   SpO2 97 % 06/10/25 11:12   Vitals shown include unfiled device data.    Anesthesia Post Evaluation    Patient location during evaluation: PACU  Patient participation: complete - patient participated  Level of consciousness: awake and alert  Pain management: satisfactory to patient  Airway patency: patent  Cardiovascular status: acceptable  Respiratory status: acceptable  Hydration status: acceptable  Postoperative Nausea and Vomiting: none        There were no known notable events for this encounter.    "

## 2025-06-14 LAB
APPEARANCE STONE: NORMAL
COMPN STONE: NORMAL
SPECIMEN WT: 40 MG

## 2025-06-24 ENCOUNTER — HOSPITAL ENCOUNTER (OUTPATIENT)
Dept: RADIOLOGY | Facility: CLINIC | Age: 45
Discharge: HOME | End: 2025-06-24
Payer: COMMERCIAL

## 2025-06-24 DIAGNOSIS — Z13.6 SCREENING FOR CARDIOVASCULAR CONDITION: ICD-10-CM

## 2025-06-24 PROCEDURE — 75571 CT HRT W/O DYE W/CA TEST: CPT

## 2025-07-08 DIAGNOSIS — N20.1 URETERAL CALCULUS: ICD-10-CM

## 2025-07-15 ENCOUNTER — TELEPHONE (OUTPATIENT)
Dept: PRIMARY CARE | Facility: CLINIC | Age: 45
End: 2025-07-15
Payer: COMMERCIAL

## 2025-07-15 ENCOUNTER — PATIENT MESSAGE (OUTPATIENT)
Dept: PRIMARY CARE | Facility: CLINIC | Age: 45
End: 2025-07-15
Payer: COMMERCIAL

## 2025-07-15 DIAGNOSIS — J45.909 MILD ASTHMA WITHOUT COMPLICATION, UNSPECIFIED WHETHER PERSISTENT (HHS-HCC): ICD-10-CM

## 2025-07-15 DIAGNOSIS — I25.10 CORONARY ARTERY DISEASE INVOLVING NATIVE CORONARY ARTERY OF NATIVE HEART WITHOUT ANGINA PECTORIS: ICD-10-CM

## 2025-07-15 DIAGNOSIS — R93.1 AGATSTON CAC SCORE 100-199: Primary | ICD-10-CM

## 2025-07-15 DIAGNOSIS — E78.00 PURE HYPERCHOLESTEROLEMIA: ICD-10-CM

## 2025-07-15 RX ORDER — ATORVASTATIN CALCIUM 10 MG/1
10 TABLET, FILM COATED ORAL DAILY
Qty: 90 TABLET | Refills: 3 | Status: SHIPPED | OUTPATIENT
Start: 2025-07-15

## 2025-07-15 RX ORDER — ASPIRIN 81 MG/1
81 TABLET ORAL DAILY
Qty: 90 TABLET | Refills: 3 | Status: SHIPPED | OUTPATIENT
Start: 2025-07-15

## 2025-07-15 RX ORDER — FLUTICASONE PROPIONATE AND SALMETEROL 100; 50 UG/1; UG/1
1 POWDER RESPIRATORY (INHALATION)
Qty: 180 EACH | Refills: 3 | Status: SHIPPED | OUTPATIENT
Start: 2025-07-15

## 2025-07-15 NOTE — PATIENT COMMUNICATION
BRIEF NOTE    Subjective/Objective:  MyChart refill request.     Assessment/Plan:  1. Mild asthma without complication, unspecified whether persistent ([facility])  - Sent to [facility].   - fluticasone propion-salmeteroL (Wixela Inhub) 100-50 mcg/dose diskus inhaler; Inhale 1 puff 2 times a day.  Dispense: 180 each; Refill: 3      No problem-specific Assessment & Plan notes found for this encounter.        Kathy Park DO, [place]  [facility]  Office: [phone]  7/15/2025 6:25 PM

## 2025-07-16 NOTE — TELEPHONE ENCOUNTER
----- Message from Lillian Miner sent at 6/24/2025  9:08 PM EDT -----  Regarding: intermed risk cor andrea score  Pt reassigned to Dr. Park   ----- Message -----  From: Interface, Radiology Results In  Sent: 6/24/2025   4:38 PM EDT  To: Lillian Miner DO

## 2025-07-16 NOTE — TELEPHONE ENCOUNTER
I called and spoke with pt, he was informed and verbalized understanding. Pt states that he would like your opinion on what he could do possibly down the road for his cholesterol. He states that maybe if there is something that he could do with his diet, exercise, etc so that he does not have to take these meds for a longer period of time down the road.

## 2025-07-16 NOTE — TELEPHONE ENCOUNTER
Please call patient and let him know that we got a CT cardiac score back.  His CT cardiac score does show that he has plaque in his vessel in the main part of his heart as well as 1 that wraps around the heart.  The amount of plaque that there puts him in a moderate risk category and for this reason I would like him to see cardiology.  I placed the referral and he can additionally the scan showed call Eduardo Arvizu's direct phone # [phone] to schedule your referrals, imaging, or tests. If she is unavailable,  you can call [facility] at [phone].    Some plaque and calcifications at the very top of his aorta but this was mild.  There was no lymph nodes that they saw in the lungs.  They did see a small solid noncalcified nodule though in the left lung, and this appears benign and we do not need to follow it up, as there was no suspicious looking findings and no other nodule seen.    The rest of his CT scan showed normal findings in the upper part of the abdomen and he had some mild arthritis in the back.    Additionally, I do recommend that he start a cholesterol-lowering medication and get his labs done that were ordered by me in the spring.  Based off the plaque in the heart his goal LDL should be less than 55, he was last on 113 back in 2024.  Most people have a pretty hard time getting down to 55 without medication.  For this reason I am going to send atorvastatin with the lowest dose of the medication to his pharmacy and he should take this daily.  Additionally he should start taking a baby aspirin daily which I also will send to the pharmacy.    When people start statin medications the most common symptoms are flulike symptoms the first few days and you may have some muscle aches, but this does not happen in everybody.  If this persists, please let us know and we will change the medication.    Kathy Park DO, [place]  [facility]  Office: [phone]  7/15/2025  8:12 PM      1. Agatston CAC score 100-199 (Primary)  - atorvastatin (Lipitor) 10 mg tablet; Take 1 tablet (10 mg) by mouth once daily.  Dispense: 90 tablet; Refill: 3  - Referral to Cardiology; Future  - aspirin 81 mg EC tablet; Take 1 tablet (81 mg) by mouth once daily.  Dispense: 90 tablet; Refill: 3    2. Coronary artery disease involving native coronary artery of native heart without angina pectoris  - atorvastatin (Lipitor) 10 mg tablet; Take 1 tablet (10 mg) by mouth once daily.  Dispense: 90 tablet; Refill: 3  - Referral to Cardiology; Future  - aspirin 81 mg EC tablet; Take 1 tablet (81 mg) by mouth once daily.  Dispense: 90 tablet; Refill: 3    3. Pure hypercholesterolemia  - atorvastatin (Lipitor) 10 mg tablet; Take 1 tablet (10 mg) by mouth once daily.  Dispense: 90 tablet; Refill: 3  - Referral to Cardiology; Future

## 2025-07-22 ENCOUNTER — APPOINTMENT (OUTPATIENT)
Dept: PRIMARY CARE | Facility: CLINIC | Age: 45
End: 2025-07-22
Payer: COMMERCIAL

## 2025-08-11 PROBLEM — E66.811 CLASS 1 OBESITY WITH BODY MASS INDEX (BMI) OF 31.0 TO 31.9 IN ADULT: Status: ACTIVE | Noted: 2025-08-11

## 2025-08-27 ENCOUNTER — APPOINTMENT (OUTPATIENT)
Age: 45
End: 2025-08-27
Payer: COMMERCIAL

## 2025-09-02 ENCOUNTER — PATIENT MESSAGE (OUTPATIENT)
Dept: PRIMARY CARE | Facility: CLINIC | Age: 45
End: 2025-09-02

## 2025-09-02 ENCOUNTER — OFFICE VISIT (OUTPATIENT)
Dept: CARDIOLOGY | Facility: CLINIC | Age: 45
End: 2025-09-02
Payer: COMMERCIAL

## 2025-09-02 VITALS
OXYGEN SATURATION: 95 % | WEIGHT: 237 LBS | DIASTOLIC BLOOD PRESSURE: 71 MMHG | BODY MASS INDEX: 33.05 KG/M2 | HEART RATE: 75 BPM | SYSTOLIC BLOOD PRESSURE: 116 MMHG

## 2025-09-02 DIAGNOSIS — E78.5 HYPERLIPIDEMIA, UNSPECIFIED HYPERLIPIDEMIA TYPE: ICD-10-CM

## 2025-09-02 DIAGNOSIS — I25.10 ASHD (ARTERIOSCLEROTIC HEART DISEASE): Primary | ICD-10-CM

## 2025-09-02 DIAGNOSIS — K22.2 SCHATZKI'S RING: ICD-10-CM

## 2025-09-02 DIAGNOSIS — I10 PRIMARY HYPERTENSION: ICD-10-CM

## 2025-09-02 PROCEDURE — 99212 OFFICE O/P EST SF 10 MIN: CPT

## 2025-09-03 RX ORDER — OMEPRAZOLE 40 MG/1
40 CAPSULE, DELAYED RELEASE ORAL
Qty: 90 CAPSULE | Refills: 3 | Status: SHIPPED | OUTPATIENT
Start: 2025-09-03

## 2025-09-04 LAB
AMMONIA 24H UR-SRATE: 43 MEQ/DAY (ref 14–62)
CA H2 PHOS DIHYD 24H SATFR UR: 2.25
CALCIUM 24H UR-MRATE: 234 MG/DAY
CAOX INDEX 24H UR-RTO: 1.61
CITRATE 24H UR-MRATE: 805 MG/DAY
CREAT 24H UR-MRATE: 1789 MG/DAY (ref 800–2000)
MAGNESIUM 24H UR-MRATE: 140 MG/DAY
NA URATE 24H SATFR UR: 3.55
OXALATE 24H UR-MRATE: 44 MG/DAY
PATIENT HX: ABNORMAL
PH 24H UR: 6.5 [PH] (ref 5.5–7)
PHOSPHATE 24H UR-MRATE: 1057 MG/DAY
POTASSIUM 24H UR-SRATE: 48 MEQ/DAY (ref 19–135)
QUEST SUPERSATURATION INDEX WITH RESPECT TO:: ABNORMAL
QUEST SUSPECTED PROBLEM IS:: ABNORMAL
SODIUM 24H UR-SRATE: 234 MEQ/DAY
SPECIMEN VOL 24H UR: 2.14 L/DAY
SULFATE 24H UR-SRATE: 19 MMOL/DAY
TRI-PHOS 24H SATFR UR: 3.09
URATE 24H SATFR UR: 0.61
URATE 24H UR-MRATE: 873 MG/DAY

## 2025-09-11 ENCOUNTER — APPOINTMENT (OUTPATIENT)
Age: 45
End: 2025-09-11
Payer: COMMERCIAL

## 2026-01-02 ENCOUNTER — APPOINTMENT (OUTPATIENT)
Dept: PRIMARY CARE | Facility: CLINIC | Age: 46
End: 2026-01-02
Payer: COMMERCIAL

## (undated) DEVICE — LASER FIBER, HOLMIUM 200 (5/BOX)

## (undated) DEVICE — Device

## (undated) DEVICE — BAG, DRAINAGE, URINARY, W/MONO-FLO ANTI-REFLUX DEVICE, NEEDLESS SAMPLING PORT,SPLASHGUARD II/SAFEGUARD, 2000 CC, STERILE, LF

## (undated) DEVICE — SOLUTION, INJECTION, CONTRAST, OMNIPAQUE 240MG 50ML, PLUS PAK

## (undated) DEVICE — GUIDEWIRE, STRAIGHT TIP,  .038 DIA, 180 CM, 3 CM TIP"

## (undated) DEVICE — COLLECTION BAG, FLUID, NON-STERILE